# Patient Record
Sex: FEMALE | Race: WHITE | NOT HISPANIC OR LATINO | Employment: FULL TIME | URBAN - METROPOLITAN AREA
[De-identification: names, ages, dates, MRNs, and addresses within clinical notes are randomized per-mention and may not be internally consistent; named-entity substitution may affect disease eponyms.]

---

## 2017-03-01 ENCOUNTER — RESULTS ENCOUNTER (OUTPATIENT)
Dept: FAMILY MEDICINE CLINIC | Facility: CLINIC | Age: 57
End: 2017-03-01

## 2017-03-01 DIAGNOSIS — N28.9 LOW KIDNEY FUNCTION: ICD-10-CM

## 2018-10-10 DIAGNOSIS — Z12.39 BREAST CANCER SCREENING: Primary | ICD-10-CM

## 2018-11-09 ENCOUNTER — OFFICE VISIT (OUTPATIENT)
Dept: FAMILY MEDICINE CLINIC | Facility: CLINIC | Age: 58
End: 2018-11-09

## 2018-11-09 VITALS
BODY MASS INDEX: 22.07 KG/M2 | RESPIRATION RATE: 16 BRPM | WEIGHT: 149 LBS | HEIGHT: 69 IN | DIASTOLIC BLOOD PRESSURE: 70 MMHG | SYSTOLIC BLOOD PRESSURE: 124 MMHG | OXYGEN SATURATION: 97 % | HEART RATE: 80 BPM

## 2018-11-09 DIAGNOSIS — F34.1 DYSTHYMIA: ICD-10-CM

## 2018-11-09 DIAGNOSIS — Z00.00 ROUTINE GENERAL MEDICAL EXAMINATION AT A HEALTH CARE FACILITY: Primary | ICD-10-CM

## 2018-11-09 DIAGNOSIS — Z23 NEED FOR VACCINATION: ICD-10-CM

## 2018-11-09 DIAGNOSIS — Z78.0 OSTEOPENIA AFTER MENOPAUSE: ICD-10-CM

## 2018-11-09 DIAGNOSIS — M85.80 OSTEOPENIA AFTER MENOPAUSE: ICD-10-CM

## 2018-11-09 DIAGNOSIS — F40.243 ANXIETY WITH FLYING: ICD-10-CM

## 2018-11-09 PROBLEM — F32.A DEPRESSION: Status: ACTIVE | Noted: 2018-11-09

## 2018-11-09 PROCEDURE — 90471 IMMUNIZATION ADMIN: CPT | Performed by: FAMILY MEDICINE

## 2018-11-09 PROCEDURE — 90750 HZV VACC RECOMBINANT IM: CPT | Performed by: FAMILY MEDICINE

## 2018-11-09 PROCEDURE — 99396 PREV VISIT EST AGE 40-64: CPT | Performed by: FAMILY MEDICINE

## 2018-11-09 PROCEDURE — 90732 PPSV23 VACC 2 YRS+ SUBQ/IM: CPT | Performed by: FAMILY MEDICINE

## 2018-11-09 PROCEDURE — 90472 IMMUNIZATION ADMIN EACH ADD: CPT | Performed by: FAMILY MEDICINE

## 2018-11-09 RX ORDER — ALPRAZOLAM 0.25 MG/1
0.25 TABLET ORAL 2 TIMES DAILY PRN
Qty: 15 TABLET | Refills: 0 | Status: SHIPPED | OUTPATIENT
Start: 2018-11-09

## 2018-11-09 RX ORDER — LORATADINE 10 MG/1
10 TABLET ORAL
COMMUNITY

## 2018-11-09 RX ORDER — RALOXIFENE HYDROCHLORIDE 60 MG/1
60 TABLET, FILM COATED ORAL
COMMUNITY
Start: 2017-11-10 | End: 2018-11-09 | Stop reason: SDUPTHER

## 2018-11-09 RX ORDER — BUPROPION HYDROCHLORIDE 150 MG/1
150 TABLET, EXTENDED RELEASE ORAL DAILY
Qty: 90 TABLET | Refills: 2 | Status: SHIPPED | OUTPATIENT
Start: 2018-11-09 | End: 2020-05-08

## 2018-11-09 RX ORDER — RALOXIFENE HYDROCHLORIDE 60 MG/1
60 TABLET, FILM COATED ORAL DAILY
Qty: 90 TABLET | Refills: 2 | Status: SHIPPED | OUTPATIENT
Start: 2018-11-09 | End: 2019-10-09 | Stop reason: SDUPTHER

## 2018-11-09 NOTE — PATIENT INSTRUCTIONS
Annual Wellness  Personal Prevention Plan of Service   Please come back for fasting labs.  Date of Office Visit:  2018  Encounter Provider:  Maurilio Benoit MD  Place of Service:  [facility]  Patient Name: Sofia Mendez  :  1960    As part of the Annual Wellness portion of your visit today, we are providing you with this personalized preventive plan of services (PPPS). This plan is based upon recommendations of the [place] Preventive Services Task Force (USPSTF) and the Advisory Committee on Immunization Practices (ACIP).    This lists the preventive care services that should be considered, and provides dates of when you are due. Items listed as completed are up-to-date and do not require any further intervention.    Health Maintenance   Topic Date Due   • PNEUMOCOCCAL VACCINE (19-64 MEDIUM RISK) (1 of 1 - PPSV23) 1979   • ZOSTER VACCINE (1 of 2) 2010   • PAP SMEAR  10/01/2019   • DXA SCAN  10/24/2019   • ANNUAL PHYSICAL  11/10/2019   • MAMMOGRAM  2020   • TDAP/TD VACCINES (2 - Td) 2021   • COLONOSCOPY  10/09/2024   • HEPATITIS C SCREENING  Completed   • INFLUENZA VACCINE  Completed       Orders Placed This Encounter   Procedures   • Shingrix Vaccine   • Pneumococcal Polysaccharide Vaccine 23-Valent (PPSV23) Greater Than or Equal To 1yo Subcutaneous / IM   • CBC (No Diff)     Standing Status:   Future     Standing Expiration Date:   2019   • Comprehensive Metabolic Panel     Standing Status:   Future     Standing Expiration Date:   2019   • Lipid Panel With / Chol / HDL Ratio     Standing Status:   Future     Standing Expiration Date:   2019       Return in about 1 year (around 2019) for Annual physical.

## 2018-11-09 NOTE — PROGRESS NOTES
"                                                                                                                    Preventive Exam    History of Present Illness: Sofia is here for check up and review of routine health maintenance. She states she is doing well and has no concerns    REVIEW OF SYSTEMS  Constitutional: Negative.    HENT: Negative.    Eyes: Negative.    Respiratory: Negative.    Cardiovascular: Negative.    Gastrointestinal: Negative.    Endocrine: Negative.    Genitourinary: Negative.    Musculoskeletal: Negative.  Skin: Negative.    Allergic/Immunologic: Negative.    Neurological: Negative.    Hematological: Negative.    Psychiatric/Behavioral: Negative.    All other systems reviewed and are negative.          PHYSICAL EXAM    Vitals:    11/09/18 1532   BP: 124/70   Pulse: 80   Resp: 16   SpO2: 97%   Weight: 67.6 kg (149 lb)   Height: 175.3 cm (69\")     GENERAL: alert and oriented, afebrile and vital signs stable  HEENT: oral mucosa moist, PEERLA, EOM, conjunctiva normal  No cervical adenopathy  LUNGS: clear to ascultation bilaterally, no rales, ronchi or wheezing  HEART: RRR S1 S2 without murmers, thrills, rubs or gallops  CHEST WALL: within normal limits, no tenderness  BREAST EXAM: No masses, skin changes, tenderness or discharge noted  ABDOMEN: WNL. Normal BS.  EXTREMITIES: No clubbing, cyanosis or edema noted. Normal Pulses.  SKIN: warm, dry, no rashes noted  NEURO: CN II- XII grossly intact    ASSESSMENT AND PLAN  Problem List Items Addressed This Visit     None        Routine health maintenance reviewed and discussed with Sofia.    .No orders of the defined types were placed in this encounter.    No Follow-up on file.   Preventive Exam    History of Present Illness: Sofia is here for check up and review of routine health maintenance. She states she is doing well and has no concerns    REVIEW OF SYSTEMS  Constitutional: Negative.    HENT: Negative.    Eyes: Negative.    Respiratory: Negative.  " "  Cardiovascular: Negative.    Gastrointestinal: Negative.    Endocrine: Negative.    Genitourinary: Negative.    Musculoskeletal: Negative.  Skin: Negative.    Allergic/Immunologic: Negative.    Neurological: Negative.    Hematological: Negative.    Psychiatric/Behavioral: Negative.    All other systems reviewed and are negative.          PHYSICAL EXAM    Vitals:    11/09/18 1532   BP: 124/70   Pulse: 80   Resp: 16   SpO2: 97%   Weight: 67.6 kg (149 lb)   Height: 175.3 cm (69\")     GENERAL: alert and oriented, afebrile and vital signs stable  HEENT: oral mucosa moist, PEERLA, EOM, conjunctiva normal  No cervical adenopathy  LUNGS: clear to ascultation bilaterally, no rales, ronchi or wheezing  HEART: RRR S1 S2 without murmers, thrills, rubs or gallops  CHEST WALL: within normal limits, no tenderness  BREAST EXAM: No masses, skin changes, tenderness or discharge noted  ABDOMEN: WNL. Normal BS.  EXTREMITIES: No clubbing, cyanosis or edema noted. Normal Pulses.  SKIN: warm, dry, no rashes noted  NEURO: CN II- XII grossly intact    ASSESSMENT AND PLAN  Problem List Items Addressed This Visit     None        Routine health maintenance reviewed and discussed with Sofia.    .No orders of the defined types were placed in this encounter.    No Follow-up on file.   Preventive Exam    History of Present Illness: Sofia is here for check up and review of routine health maintenance. She states she is doing well and has no concerns    REVIEW OF SYSTEMS  Constitutional: Negative.    HENT: Negative.    Eyes: Negative.    Respiratory: Negative.    Cardiovascular: Negative.    Gastrointestinal: Negative.    Endocrine: Negative.    Genitourinary: Negative.    Musculoskeletal: Negative.  Skin: Negative.    Allergic/Immunologic: Negative.    Neurological: Negative.    Hematological: Negative.    Psychiatric/Behavioral: Negative.    All other systems reviewed and are negative.          PHYSICAL EXAM    Vitals:    11/09/18 1532   BP: 124/70 " "  Pulse: 80   Resp: 16   SpO2: 97%   Weight: 67.6 kg (149 lb)   Height: 175.3 cm (69\")     GENERAL: alert and oriented, afebrile and vital signs stable  HEENT: oral mucosa moist, PEERLA, EOM, conjunctiva normal  No cervical adenopathy  LUNGS: clear to ascultation bilaterally, no rales, ronchi or wheezing  HEART: RRR S1 S2 without murmers, thrills, rubs or gallops  CHEST WALL: within normal limits, no tenderness  BREAST EXAM: No masses, skin changes, tenderness or discharge noted  ABDOMEN: WNL. Normal BS.  EXTREMITIES: No clubbing, cyanosis or edema noted. Normal Pulses.  SKIN: warm, dry, no rashes noted  NEURO: CN II- XII grossly intact    ASSESSMENT AND PLAN  Problem List Items Addressed This Visit     None        Routine health maintenance reviewed and discussed with Sofia.    .No orders of the defined types were placed in this encounter.    No Follow-up on file.   Preventive Exam    History of Present Illness: Sofia is here for check up and review of routine health maintenance. She states she is doing well and has no concerns    REVIEW OF SYSTEMS  Constitutional: Negative.    HENT: Negative.    Eyes: Negative.    Respiratory: Negative.    Cardiovascular: Negative.    Gastrointestinal: Negative.    Endocrine: Negative.    Genitourinary: Negative.    Musculoskeletal: Negative.  Skin: Negative.    Allergic/Immunologic: Negative.    Neurological: Negative.    Hematological: Negative.    Psychiatric/Behavioral: Negative.    All other systems reviewed and are negative.          PHYSICAL EXAM    Vitals:    11/09/18 1532   BP: 124/70   Pulse: 80   Resp: 16   SpO2: 97%   Weight: 67.6 kg (149 lb)   Height: 175.3 cm (69\")     GENERAL: alert and oriented, afebrile and vital signs stable  HEENT: oral mucosa moist, PEERLA, EOM, conjunctiva normal  No cervical adenopathy  LUNGS: clear to ascultation bilaterally, no rales, ronchi or wheezing  HEART: RRR S1 S2 without murmers, thrills, rubs or gallops  CHEST WALL: within normal " "limits, no tenderness  BREAST EXAM: No masses, skin changes, tenderness or discharge noted  ABDOMEN: WNL. Normal BS.  EXTREMITIES: No clubbing, cyanosis or edema noted. Normal Pulses.  SKIN: warm, dry, no rashes noted  NEURO: CN II- XII grossly intact    ASSESSMENT AND PLAN  Problem List Items Addressed This Visit     None        Routine health maintenance reviewed and discussed with Sofia.    .No orders of the defined types were placed in this encounter.    No Follow-up on file.   Preventive Exam    History of Present Illness: Sofia is here for check up and review of routine health maintenance. She states she is doing well and has no concerns    REVIEW OF SYSTEMS  Constitutional: Negative.    HENT: Negative.    Eyes: Negative.    Respiratory: Negative.    Cardiovascular: Negative.    Gastrointestinal: Negative.    Endocrine: Negative.    Genitourinary: Negative.    Musculoskeletal: Negative.  Skin: Negative.    Allergic/Immunologic: Negative.    Neurological: Negative.    Hematological: Negative.    Psychiatric/Behavioral: Negative.    All other systems reviewed and are negative.          PHYSICAL EXAM    Vitals:    11/09/18 1532   BP: 124/70   Pulse: 80   Resp: 16   SpO2: 97%   Weight: 67.6 kg (149 lb)   Height: 175.3 cm (69\")     GENERAL: alert and oriented, afebrile and vital signs stable  HEENT: oral mucosa moist, PEERLA, EOM, conjunctiva normal  No cervical adenopathy  LUNGS: clear to ascultation bilaterally, no rales, ronchi or wheezing  HEART: RRR S1 S2 without murmers, thrills, rubs or gallops  CHEST WALL: within normal limits, no tenderness  BREAST EXAM: No masses, skin changes, tenderness or discharge noted  ABDOMEN: WNL. Normal BS.  EXTREMITIES: No clubbing, cyanosis or edema noted. Normal Pulses.  SKIN: warm, dry, no rashes noted  NEURO: CN II- XII grossly intact    ASSESSMENT AND PLAN  Problem List Items Addressed This Visit     None        Routine health maintenance reviewed and discussed with " "Sofia.    .No orders of the defined types were placed in this encounter.    No Follow-up on file.   Preventive Exam    History of Present Illness: Sofia is here for check up and review of routine health maintenance. She states she is doing well and has no concerns    REVIEW OF SYSTEMS  Constitutional: Negative.    HENT: Negative.    Eyes: Negative.    Respiratory: Negative.    Cardiovascular: Negative.    Gastrointestinal: Negative.    Endocrine: Negative.    Genitourinary: Negative.    Musculoskeletal: Negative.  Skin: Negative.    Allergic/Immunologic: Negative.    Neurological: Negative.    Hematological: Negative.    Psychiatric/Behavioral: Negative.    All other systems reviewed and are negative.          PHYSICAL EXAM    Vitals:    11/09/18 1532   BP: 124/70   Pulse: 80   Resp: 16   SpO2: 97%   Weight: 67.6 kg (149 lb)   Height: 175.3 cm (69\")     GENERAL: alert and oriented, afebrile and vital signs stable  HEENT: oral mucosa moist, PEERLA, EOM, conjunctiva normal  No cervical adenopathy  LUNGS: clear to ascultation bilaterally, no rales, ronchi or wheezing  HEART: RRR S1 S2 without murmers, thrills, rubs or gallops  CHEST WALL: within normal limits, no tenderness  BREAST EXAM: No masses, skin changes, tenderness or discharge noted  ABDOMEN: WNL. Normal BS.  EXTREMITIES: No clubbing, cyanosis or edema noted. Normal Pulses.  SKIN: warm, dry, no rashes noted  NEURO: CN II- XII grossly intact    ASSESSMENT AND PLAN  Problem List Items Addressed This Visit     None        Routine health maintenance reviewed and discussed with Sofia.    .No orders of the defined types were placed in this encounter.    No Follow-up on file.   Preventive Exam    History of Present Illness: Sofia is here for check up and review of routine health maintenance. She states she is doing well and has no concerns    REVIEW OF SYSTEMS  Constitutional: Negative.    HENT: Negative.    Eyes: Negative.    Respiratory: Negative.    Cardiovascular: " "Negative.    Gastrointestinal: Negative.    Endocrine: Negative.    Genitourinary: Negative.    Musculoskeletal: Negative.  Skin: Negative.    Allergic/Immunologic: Negative.    Neurological: Negative.    Hematological: Negative.    Psychiatric/Behavioral: Negative.    All other systems reviewed and are negative.          PHYSICAL EXAM    Vitals:    11/09/18 1532   BP: 124/70   Pulse: 80   Resp: 16   SpO2: 97%   Weight: 67.6 kg (149 lb)   Height: 175.3 cm (69\")     GENERAL: alert and oriented, afebrile and vital signs stable  HEENT: oral mucosa moist, PEERLA, EOM, conjunctiva normal  No cervical adenopathy  LUNGS: clear to ascultation bilaterally, no rales, ronchi or wheezing  HEART: RRR S1 S2 without murmers, thrills, rubs or gallops  CHEST WALL: within normal limits, no tenderness  BREAST EXAM: No masses, skin changes, tenderness or discharge noted  ABDOMEN: WNL. Normal BS.  EXTREMITIES: No clubbing, cyanosis or edema noted. Normal Pulses.  SKIN: warm, dry, no rashes noted  NEURO: CN II- XII grossly intact    ASSESSMENT AND PLAN  Problem List Items Addressed This Visit     None        Routine health maintenance reviewed and discussed with Sofia.    .No orders of the defined types were placed in this encounter.    No Follow-up on file.   Preventive Exam    History of Present Illness: Sofia is here for check up and review of routine health maintenance. She states she is doing well and has no concerns    REVIEW OF SYSTEMS  Constitutional: Negative.    HENT: Negative.    Eyes: Negative.    Respiratory: Negative.    Cardiovascular: Negative.    Gastrointestinal: Negative.    Endocrine: Negative.    Genitourinary: Negative.    Musculoskeletal: Negative.  Skin: Negative.    Allergic/Immunologic: Negative.    Neurological: Negative.    Hematological: Negative.    Psychiatric/Behavioral: Negative.    All other systems reviewed and are negative.          PHYSICAL EXAM    Vitals:    11/09/18 1532   BP: 124/70   Pulse: 80 " "  Resp: 16   SpO2: 97%   Weight: 67.6 kg (149 lb)   Height: 175.3 cm (69\")     GENERAL: alert and oriented, afebrile and vital signs stable  HEENT: oral mucosa moist, PEERLA, EOM, conjunctiva normal  No cervical adenopathy  LUNGS: clear to ascultation bilaterally, no rales, ronchi or wheezing  HEART: RRR S1 S2 without murmers, thrills, rubs or gallops  CHEST WALL: within normal limits, no tenderness  BREAST EXAM: No masses, skin changes, tenderness or discharge noted  ABDOMEN: WNL. Normal BS.  EXTREMITIES: No clubbing, cyanosis or edema noted. Normal Pulses.  SKIN: warm, dry, no rashes noted  NEURO: CN II- XII grossly intact    ASSESSMENT AND PLAN  Problem List Items Addressed This Visit     None        Routine health maintenance reviewed and discussed with Sofia.    .No orders of the defined types were placed in this encounter.    No Follow-up on file.   Preventive Exam    History of Present Illness: Sofia is here for check up and review of routine health maintenance. She states she is doing well and has no concerns    REVIEW OF SYSTEMS  Constitutional: Negative.    HENT: Negative.    Eyes: Negative.    Respiratory: Negative.    Cardiovascular: Negative.    Gastrointestinal: Negative.    Endocrine: Negative.    Genitourinary: Negative.    Musculoskeletal: Negative.  Skin: Negative.    Allergic/Immunologic: Negative.    Neurological: Negative.    Hematological: Negative.    Psychiatric/Behavioral: Negative.    All other systems reviewed and are negative.          PHYSICAL EXAM    Vitals:    11/09/18 1532   BP: 124/70   Pulse: 80   Resp: 16   SpO2: 97%   Weight: 67.6 kg (149 lb)   Height: 175.3 cm (69\")     GENERAL: alert and oriented, afebrile and vital signs stable  HEENT: oral mucosa moist, PEERLA, EOM, conjunctiva normal  No cervical adenopathy  LUNGS: clear to ascultation bilaterally, no rales, ronchi or wheezing  HEART: RRR S1 S2 without murmers, thrills, rubs or gallops  CHEST WALL: within normal limits, no " "tenderness  BREAST EXAM: No masses, skin changes, tenderness or discharge noted  ABDOMEN: WNL. Normal BS.  EXTREMITIES: No clubbing, cyanosis or edema noted. Normal Pulses.  SKIN: warm, dry, no rashes noted  NEURO: CN II- XII grossly intact    ASSESSMENT AND PLAN  Problem List Items Addressed This Visit     None        Routine health maintenance reviewed and discussed with Sofia.    .No orders of the defined types were placed in this encounter.    No Follow-up on file.   Preventive Exam    History of Present Illness: Sofia is here for check up and review of routine health maintenance. She states she is doing well and has no concerns    REVIEW OF SYSTEMS  Constitutional: Negative.    HENT: Negative.    Eyes: Negative.    Respiratory: Negative.    Cardiovascular: Negative.    Gastrointestinal: Negative.    Endocrine: Negative.    Genitourinary: Negative.    Musculoskeletal: Negative.  Skin: Negative.    Allergic/Immunologic: Negative.    Neurological: Negative.    Hematological: Negative.    Psychiatric/Behavioral: Negative.    All other systems reviewed and are negative.          PHYSICAL EXAM    Vitals:    11/09/18 1532   BP: 124/70   Pulse: 80   Resp: 16   SpO2: 97%   Weight: 67.6 kg (149 lb)   Height: 175.3 cm (69\")     GENERAL: alert and oriented, afebrile and vital signs stable  HEENT: oral mucosa moist, PEERLA, EOM, conjunctiva normal  No cervical adenopathy  LUNGS: clear to ascultation bilaterally, no rales, ronchi or wheezing  HEART: RRR S1 S2 without murmers, thrills, rubs or gallops  CHEST WALL: within normal limits, no tenderness  BREAST EXAM: No masses, skin changes, tenderness or discharge noted  ABDOMEN: WNL. Normal BS.  EXTREMITIES: No clubbing, cyanosis or edema noted. Normal Pulses.  SKIN: warm, dry, no rashes noted  NEURO: CN II- XII grossly intact    ASSESSMENT AND PLAN  Problem List Items Addressed This Visit     None        Routine health maintenance reviewed and discussed with Sofia.    .No orders " "of the defined types were placed in this encounter.    No Follow-up on file.   Preventive Exam    History of Present Illness: Sofia is here for check up and review of routine health maintenance. She states she is doing well and has no concerns    REVIEW OF SYSTEMS  Constitutional: Negative.    HENT: Negative.    Eyes: Negative.    Respiratory: Negative.    Cardiovascular: Negative.    Gastrointestinal: Negative.    Endocrine: Negative.    Genitourinary: Negative.    Musculoskeletal: Negative.  Skin: Negative.    Allergic/Immunologic: Negative.    Neurological: Negative.    Hematological: Negative.    Psychiatric/Behavioral: Negative.    All other systems reviewed and are negative.          PHYSICAL EXAM    Vitals:    11/09/18 1532   BP: 124/70   Pulse: 80   Resp: 16   SpO2: 97%   Weight: 67.6 kg (149 lb)   Height: 175.3 cm (69\")     GENERAL: alert and oriented, afebrile and vital signs stable  HEENT: oral mucosa moist, PEERLA, EOM, conjunctiva normal  No cervical adenopathy  LUNGS: clear to ascultation bilaterally, no rales, ronchi or wheezing  HEART: RRR S1 S2 without murmers, thrills, rubs or gallops  CHEST WALL: within normal limits, no tenderness  BREAST EXAM: No masses, skin changes, tenderness or discharge noted  ABDOMEN: WNL. Normal BS.  EXTREMITIES: No clubbing, cyanosis or edema noted. Normal Pulses.  SKIN: warm, dry, no rashes noted  NEURO: CN II- XII grossly intact    ASSESSMENT AND PLAN  Problem List Items Addressed This Visit     None        Routine health maintenance reviewed and discussed with Sofia.    .No orders of the defined types were placed in this encounter.    No Follow-up on file.   Preventive Exam    History of Present Illness: Sofia is here for check up and review of routine health maintenance. She states she is doing well and has no concerns    REVIEW OF SYSTEMS  Constitutional: Negative.    HENT: Negative.    Eyes: Negative.    Respiratory: Negative.    Cardiovascular: Negative.  " "  Gastrointestinal: Negative.    Endocrine: Negative.    Genitourinary: Negative.    Musculoskeletal: Negative.  Skin: Negative.    Allergic/Immunologic: Negative.    Neurological: Negative.    Hematological: Negative.    Psychiatric/Behavioral: Negative.    All other systems reviewed and are negative.          PHYSICAL EXAM    Vitals:    11/09/18 1532   BP: 124/70   Pulse: 80   Resp: 16   SpO2: 97%   Weight: 67.6 kg (149 lb)   Height: 175.3 cm (69\")     GENERAL: alert and oriented, afebrile and vital signs stable  HEENT: oral mucosa moist, PEERLA, EOM, conjunctiva normal  No cervical adenopathy  LUNGS: clear to ascultation bilaterally, no rales, ronchi or wheezing  HEART: RRR S1 S2 without murmers, thrills, rubs or gallops  CHEST WALL: within normal limits, no tenderness  BREAST EXAM: No masses, skin changes, tenderness or discharge noted  ABDOMEN: WNL. Normal BS.  EXTREMITIES: No clubbing, cyanosis or edema noted. Normal Pulses.  SKIN: warm, dry, no rashes noted  NEURO: CN II- XII grossly intact    ASSESSMENT AND PLAN  Problem List Items Addressed This Visit     None        Routine health maintenance reviewed and discussed with Sofia.    .No orders of the defined types were placed in this encounter.    No Follow-up on file.   Preventive Exam    History of Present Illness: Sofia is here for check up and review of routine health maintenance. She states she is doing well and has no concerns    REVIEW OF SYSTEMS  Constitutional: Negative.    HENT: Negative.    Eyes: Negative.    Respiratory: Negative.    Cardiovascular: Negative.    Gastrointestinal: Negative.    Endocrine: Negative.    Genitourinary: Negative.    Musculoskeletal: Negative.  Skin: Negative.    Allergic/Immunologic: Negative.    Neurological: Negative.    Hematological: Negative.    Psychiatric/Behavioral: Negative.    All other systems reviewed and are negative.          PHYSICAL EXAM    Vitals:    11/09/18 1532   BP: 124/70   Pulse: 80   Resp: 16 " "  SpO2: 97%   Weight: 67.6 kg (149 lb)   Height: 175.3 cm (69\")     GENERAL: alert and oriented, afebrile and vital signs stable  HEENT: oral mucosa moist, PEERLA, EOM, conjunctiva normal  No cervical adenopathy  LUNGS: clear to ascultation bilaterally, no rales, ronchi or wheezing  HEART: RRR S1 S2 without murmers, thrills, rubs or gallops  CHEST WALL: within normal limits, no tenderness  BREAST EXAM: No masses, skin changes, tenderness or discharge noted  ABDOMEN: WNL. Normal BS.  EXTREMITIES: No clubbing, cyanosis or edema noted. Normal Pulses.  SKIN: warm, dry, no rashes noted  NEURO: CN II- XII grossly intact    ASSESSMENT AND PLAN  Problem List Items Addressed This Visit     None        Routine health maintenance reviewed and discussed with Sofia.    .No orders of the defined types were placed in this encounter.    No Follow-up on file.   Preventive Exam    History of Present Illness: Sofia is here for check up and review of routine health maintenance. She states she is doing well and has no concerns    REVIEW OF SYSTEMS  Constitutional: Negative.    HENT: Negative.    Eyes: Negative.    Respiratory: Negative.    Cardiovascular: Negative.    Gastrointestinal: Negative.    Endocrine: Negative.    Genitourinary: Negative.    Musculoskeletal: Negative.  Skin: Negative.    Allergic/Immunologic: Negative.    Neurological: Negative.    Hematological: Negative.    Psychiatric/Behavioral: Negative.    All other systems reviewed and are negative.          PHYSICAL EXAM    Vitals:    11/09/18 1532   BP: 124/70   Pulse: 80   Resp: 16   SpO2: 97%   Weight: 67.6 kg (149 lb)   Height: 175.3 cm (69\")     GENERAL: alert and oriented, afebrile and vital signs stable  HEENT: oral mucosa moist, PEERLA, EOM, conjunctiva normal  No cervical adenopathy  LUNGS: clear to ascultation bilaterally, no rales, ronchi or wheezing  HEART: RRR S1 S2 without murmers, thrills, rubs or gallops  CHEST WALL: within normal limits, no " of the defined types were placed in this encounter.    No Follow-up on file.

## 2018-11-09 NOTE — PROGRESS NOTES
"Preventive Exam    History of Present Illness: Sofia is here for check up and review of routine health maintenance. She states she is doing well;  Sofia states she has a hx of depression and  that she is getting good relief from symptoms on current medication, Wellbutrin. This is a chronic problem that is responding well to treatment. She has been treated for this in the past by her gyn and is asking me to help with this now. Sofia has no intolerable side effects to medication and reports that his helps lift mood and makes daily life, relationships better. No thoughts of self harm expressed.  She has a hx of osteopenia and is on Evista. She will not be seeing her gynecologist for a year and has asked me to refill this for her today.  She will be travelling to [place] in a month to join her son there for the holidays. She is requesting medication to help with flight anxiety.    REVIEW OF SYSTEMS  Constitutional: Negative.    HENT: Negative.    Eyes: Negative.    Respiratory: Negative.    Cardiovascular: Negative.    Gastrointestinal: Negative.    Endocrine: Negative.    Genitourinary: Negative.    Musculoskeletal: Negative.  Skin: Negative.    Allergic/Immunologic: Negative.    Neurological: Negative.    Hematological: Negative.    Psychiatric/Behavioral: Negative.    All other systems reviewed and are negative.          PHYSICAL EXAM    Vitals:    11/09/18 1532   BP: 124/70   Pulse: 80   Resp: 16   SpO2: 97%   Weight: 67.6 kg (149 lb)   Height: 175.3 cm (69\")     GENERAL: alert and oriented, afebrile and vital signs stable  HEENT: oral mucosa moist, PEERLA, EOM, conjunctiva normal  No cervical adenopathy  LUNGS: clear to ascultation bilaterally, no rales, ronchi or wheezing  HEART: RRR S1 S2 without murmers, thrills, rubs or gallops  CHEST WALL: within normal limits, no tenderness  ABDOMEN: WNL. Normal BS.  EXTREMITIES: No clubbing, cyanosis or edema noted. Normal Pulses.  SKIN: warm, dry, no rashes noted  NEURO: CN II- " XII grossly intact  PSYCH: good mood, positive affect, good insight, no SI or HI expressed.    ASSESSMENT AND PLAN  Problem List Items Addressed This Visit        Musculoskeletal and Integument    Osteopenia after menopause  Will refill Evista and advised f/u with gyn    Relevant Medications    raloxifene (EVISTA) 60 MG tablet       Other    Depression  She is well managed on current meds and reports no signs or symptoms of self harm, suicidal ideation. This medication helps with daily life and relationships. Will refill as needed and advised 6 month follow up.    Relevant Medications    buPROPion SR (WELLBUTRIN SR) 150 MG 12 hr tablet    Anxiety with flying  I have given her a short course of Xanax to help.    ALPRAZolam (XANAX) 0.25 MG tablet      Other Visit Diagnoses     Routine general medical examination at a health care facility    -  Primary    Relevant Orders    CBC (No Diff)    Comprehensive Metabolic Panel    Lipid Panel With / Chol / HDL Ratio    Need for vaccination        Relevant Orders    Shingrix Vaccine (Completed)    Pneumococcal Polysaccharide Vaccine 23-Valent (PPSV23) Greater Than or Equal To 3yo Subcutaneous / IM (Completed)        Routine health maintenance reviewed and discussed with Sofia.    .  Orders Placed This Encounter   Procedures   • Shingrix Vaccine   • Pneumococcal Polysaccharide Vaccine 23-Valent (PPSV23) Greater Than or Equal To 3yo Subcutaneous / IM   • CBC (No Diff)     Standing Status:   Future     Standing Expiration Date:   11/9/2019   • Comprehensive Metabolic Panel     Standing Status:   Future     Standing Expiration Date:   11/9/2019   • Lipid Panel With / Chol / HDL Ratio     Standing Status:   Future     Standing Expiration Date:   11/9/2019     Return in about 1 year (around 11/9/2019) for Annual physical.

## 2018-11-23 ENCOUNTER — RESULTS ENCOUNTER (OUTPATIENT)
Dept: FAMILY MEDICINE CLINIC | Facility: CLINIC | Age: 58
End: 2018-11-23

## 2018-11-23 DIAGNOSIS — Z00.00 ROUTINE GENERAL MEDICAL EXAMINATION AT A HEALTH CARE FACILITY: ICD-10-CM

## 2018-12-12 LAB
ALBUMIN SERPL-MCNC: 4.2 G/DL (ref 3.5–5.2)
ALBUMIN/GLOB SERPL: 2.3 G/DL
ALP SERPL-CCNC: 53 U/L (ref 39–117)
ALT SERPL-CCNC: 13 U/L (ref 1–33)
AST SERPL-CCNC: 16 U/L (ref 1–32)
BILIRUB SERPL-MCNC: 0.8 MG/DL (ref 0.1–1.2)
BUN SERPL-MCNC: 15 MG/DL (ref 6–20)
BUN/CREAT SERPL: 15.3 (ref 7–25)
CALCIUM SERPL-MCNC: 8.6 MG/DL (ref 8.6–10.5)
CHLORIDE SERPL-SCNC: 103 MMOL/L (ref 98–107)
CHOLEST SERPL-MCNC: 211 MG/DL (ref 0–200)
CHOLEST/HDLC SERPL: 2.27 {RATIO}
CO2 SERPL-SCNC: 27.4 MMOL/L (ref 22–29)
CREAT SERPL-MCNC: 0.98 MG/DL (ref 0.57–1)
ERYTHROCYTE [DISTWIDTH] IN BLOOD BY AUTOMATED COUNT: 13.7 % (ref 11.7–13)
GLOBULIN SER CALC-MCNC: 1.8 GM/DL
GLUCOSE SERPL-MCNC: 88 MG/DL (ref 65–99)
HCT VFR BLD AUTO: 42.3 % (ref 35.6–45.5)
HDLC SERPL-MCNC: 93 MG/DL (ref 40–60)
HGB BLD-MCNC: 13.1 G/DL (ref 11.9–15.5)
LDLC SERPL CALC-MCNC: 107 MG/DL (ref 0–100)
MCH RBC QN AUTO: 31 PG (ref 26.9–32)
MCHC RBC AUTO-ENTMCNC: 31 G/DL (ref 32.4–36.3)
MCV RBC AUTO: 100 FL (ref 80.5–98.2)
PLATELET # BLD AUTO: 226 10*3/MM3 (ref 140–500)
POTASSIUM SERPL-SCNC: 4.1 MMOL/L (ref 3.5–5.2)
PROT SERPL-MCNC: 6 G/DL (ref 6–8.5)
RBC # BLD AUTO: 4.23 10*6/MM3 (ref 3.9–5.2)
SODIUM SERPL-SCNC: 142 MMOL/L (ref 136–145)
TRIGL SERPL-MCNC: 56 MG/DL (ref 0–150)
VLDLC SERPL CALC-MCNC: 11.2 MG/DL (ref 5–40)
WBC # BLD AUTO: 5.85 10*3/MM3 (ref 4.5–10.7)

## 2019-10-09 DIAGNOSIS — M85.80 OSTEOPENIA AFTER MENOPAUSE: ICD-10-CM

## 2019-10-09 DIAGNOSIS — Z78.0 OSTEOPENIA AFTER MENOPAUSE: ICD-10-CM

## 2019-10-09 RX ORDER — RALOXIFENE HYDROCHLORIDE 60 MG/1
TABLET, FILM COATED ORAL
Qty: 90 TABLET | Refills: 1 | Status: SHIPPED | OUTPATIENT
Start: 2019-10-09

## 2020-05-08 ENCOUNTER — TELEMEDICINE (OUTPATIENT)
Dept: FAMILY MEDICINE CLINIC | Facility: CLINIC | Age: 60
End: 2020-05-08

## 2020-05-08 DIAGNOSIS — L24.9 IRRITANT CONTACT DERMATITIS, UNSPECIFIED TRIGGER: Primary | ICD-10-CM

## 2020-05-08 PROCEDURE — 99213 OFFICE O/P EST LOW 20 MIN: CPT | Performed by: NURSE PRACTITIONER

## 2020-05-08 RX ORDER — METHYLPREDNISOLONE 4 MG/1
TABLET ORAL
Qty: 1 EACH | Refills: 0 | Status: SHIPPED | OUTPATIENT
Start: 2020-05-08 | End: 2023-03-24

## 2020-05-08 RX ORDER — ESTRADIOL 0.1 MG/G
1 CREAM VAGINAL 2 TIMES WEEKLY
COMMUNITY
Start: 2016-10-19

## 2020-05-08 NOTE — PROGRESS NOTES
"Subjective   Sofia Mendez is a 59 y.o. female. She is a patient of Dr. Benoit, but is new to me.    History of Present Illness   Pt presents via tele-visit for c/o rash on right shoulder and arm. She states that it started last weekend with a small \"bump\" on her right shoulder which she states that she thought was a \"bug bite\".  Her rash has now spread under her arm on the right and now the left side. She has been taking benadryl and using over the counter hydrocortisone cream due to itching.  Pt is concerned that she has COVID due to something she read on the internet.  She denies being exposed to anyone with the virus.  She denies any fever, cough, shortness of breath or loss of taste or smell. She requested to be tested and I explained that she will need to go to  on [facility] for this, as we are not testing in our offices. Pt became very agitated.     The following portions of the patient's history were reviewed and updated as appropriate: allergies, current medications, past family history, past medical history, past social history, past surgical history and problem list.    Review of Systems   Constitutional: Positive for fatigue. Negative for chills and fever.   Respiratory: Negative for cough and shortness of breath.    Cardiovascular: Negative for chest pain, palpitations and leg swelling.   Musculoskeletal: Negative for arthralgias and joint swelling.   Skin: Positive for rash. Negative for color change, dry skin, pallor, skin lesions and bruise.        Right shoulder and underarm. Left underarm.    Allergic/Immunologic: Negative.    Neurological: Negative for dizziness, weakness and headache.       Objective   Physical Exam   Constitutional: She appears well-developed and well-nourished. No distress.   HENT:   Head: Normocephalic and atraumatic.   Eyes: Pupils are equal, round, and reactive to light. EOM are normal.   Neck: Normal range of motion. Neck supple.   Pulmonary/Chest: Effort normal. "   Skin: Rash noted. She is not diaphoretic.   Erythematous raised rash to right inner upper arm and axilla and left inner upper arm.  No drainage noted.    Psychiatric: Her speech is normal. Judgment and thought content normal. Her mood appears anxious. Her affect is not angry. She is agitated. Cognition and memory are normal. She does not exhibit a depressed mood.       There were no vitals filed for this visit.  There is no height or weight on file to calculate BMI.    Procedures    Assessment/Plan   Problems Addressed this Visit     None      Visit Diagnoses     Irritant contact dermatitis, unspecified trigger    -  Primary    Relevant Medications    methylPREDNISolone (MEDROL, HARVEY,) 4 MG tablet        Return if symptoms worsen or fail to improve.    Total face to face time with patient was 15 minutes.           Patient Instructions   Continue Benadryl as needed for itching.    Rash, Adult  A rash is a change in the color of your skin. A rash can also change the way your skin feels. There are many different conditions and factors that can cause a rash. Some rashes may disappear after a few days, but some may last for a few weeks. Common causes of rashes include:  · Viral infections, such as:  ? Colds.  ? Measles.  ? Hand, foot, and mouth disease.  · Bacterial infections, such as:  ? Scarlet fever.  ? Impetigo.  · Fungal infections, such as Candida.  · Allergic reactions to food, medicines, or skin care products.  Follow these instructions at home:  The goal of treatment is to stop the itching and keep the rash from spreading. Pay attention to any changes in your symptoms. Follow these instructions to help with your condition:  Medicine  Take or apply over-the-counter and prescription medicines only as told by your health care provider. These may include:  · Corticosteroid creams to treat red or swollen skin.  · Anti-itch lotions.  · Oral allergy medicines (antihistamines).  · Oral corticosteroids for severe  symptoms.    Skin care  · Apply cool compresses to the affected areas.  · Do not scratch or rub your skin.  · Avoid covering the rash. Make sure the rash is exposed to air as much as possible.  Managing itching and discomfort  · Avoid hot showers or baths, which can make itching worse. A cold shower may help.  · Try taking a bath with:  ? Epsom salts. Follow  instructions on the packaging. You can get these at your local pharmacy or grocery store.  ? Baking soda. Pour a small amount into the bath as told by your health care provider.  ? Colloidal oatmeal. Follow  instructions on the packaging. You can get this at your local pharmacy or grocery store.  · Try applying baking soda paste to your skin. Stir water into baking soda until it reaches a paste-like consistency.  · Try applying calamine lotion. This is an over-the-counter lotion that helps to relieve itchiness.  · Keep cool and out of the sun. Sweating and being hot can make itching worse.  General instructions    · Rest as needed.  · Drink enough fluid to keep your urine pale yellow.  · Wear loose-fitting clothing.  · Avoid scented soaps, detergents, and perfumes. Use gentle soaps, detergents, perfumes, and other cosmetic products.  · Avoid any substance that causes your rash. Keep a journal to help track what causes your rash. Write down:  ? What you eat.  ? What cosmetic products you use.  ? What you drink.  ? What you wear. This includes jewelry.  · Keep all follow-up visits as told by your health care provider. This is important.  Contact a health care provider if:  · You sweat at night.  · You lose weight.  · You urinate more than normal.  · You urinate less than normal, or you notice that your urine is a darker color than usual.  · You feel weak.  · You vomit.  · Your skin or the whites of your eyes look yellow (jaundice).  · Your skin:  ? Tingles.  ? Is numb.  · Your rash:  ? Does not go away after several days.  ? Gets  "worse.  · You are:  ? Unusually thirsty.  ? More tired than normal.  · You have:  ? New symptoms.  ? Pain in your abdomen.  ? A fever.  ? Diarrhea.  Get help right away if you:  · Have a fever and your symptoms suddenly get worse.  · Develop confusion.  · Have a severe headache or a stiff neck.  · Have severe joint pains or stiffness.  · Have a seizure.  · Develop a rash that covers all or most of your body. The rash may or may not be painful.  · Develop blisters that:  ? Are on top of the rash.  ? Grow larger or grow together.  ? Are painful.  ? Are inside your nose or mouth.  · Develop a rash that:  ? Looks like purple pinprick-sized spots all over your body.  ? Has a \"bull's eye\" or looks like a target.  ? Is not related to sun exposure, is red and painful, and causes your skin to peel.  Summary  · A rash is a change in the color of your skin. Some rashes disappear after a few days, but some may last for a few weeks.  · The goal of treatment is to stop the itching and keep the rash from spreading.  · Take or apply over-the-counter and prescription medicines only as told by your health care provider.  · Contact a health care provider if you have new or worsening symptoms.  · Keep all follow-up visits as told by your health care provider. This is important.  This information is not intended to replace advice given to you by your health care provider. Make sure you discuss any questions you have with your health care provider.  Document Released: 12/08/2003 Document Revised: 07/22/2019 Document Reviewed: 07/22/2019  [employer] Interactive Patient Education © 2020 [employer]        "

## 2020-05-08 NOTE — PATIENT INSTRUCTIONS
Continue Benadryl as needed for itching.    Rash, Adult  A rash is a change in the color of your skin. A rash can also change the way your skin feels. There are many different conditions and factors that can cause a rash. Some rashes may disappear after a few days, but some may last for a few weeks. Common causes of rashes include:  · Viral infections, such as:  ? Colds.  ? Measles.  ? Hand, foot, and mouth disease.  · Bacterial infections, such as:  ? Scarlet fever.  ? Impetigo.  · Fungal infections, such as Candida.  · Allergic reactions to food, medicines, or skin care products.  Follow these instructions at home:  The goal of treatment is to stop the itching and keep the rash from spreading. Pay attention to any changes in your symptoms. Follow these instructions to help with your condition:  Medicine  Take or apply over-the-counter and prescription medicines only as told by your health care provider. These may include:  · Corticosteroid creams to treat red or swollen skin.  · Anti-itch lotions.  · Oral allergy medicines (antihistamines).  · Oral corticosteroids for severe symptoms.    Skin care  · Apply cool compresses to the affected areas.  · Do not scratch or rub your skin.  · Avoid covering the rash. Make sure the rash is exposed to air as much as possible.  Managing itching and discomfort  · Avoid hot showers or baths, which can make itching worse. A cold shower may help.  · Try taking a bath with:  ? Epsom salts. Follow  instructions on the packaging. You can get these at your local pharmacy or grocery store.  ? Baking soda. Pour a small amount into the bath as told by your health care provider.  ? Colloidal oatmeal. Follow  instructions on the packaging. You can get this at your local pharmacy or grocery store.  · Try applying baking soda paste to your skin. Stir water into baking soda until it reaches a paste-like consistency.  · Try applying calamine lotion. This is an  "over-the-counter lotion that helps to relieve itchiness.  · Keep cool and out of the sun. Sweating and being hot can make itching worse.  General instructions    · Rest as needed.  · Drink enough fluid to keep your urine pale yellow.  · Wear loose-fitting clothing.  · Avoid scented soaps, detergents, and perfumes. Use gentle soaps, detergents, perfumes, and other cosmetic products.  · Avoid any substance that causes your rash. Keep a journal to help track what causes your rash. Write down:  ? What you eat.  ? What cosmetic products you use.  ? What you drink.  ? What you wear. This includes jewelry.  · Keep all follow-up visits as told by your health care provider. This is important.  Contact a health care provider if:  · You sweat at night.  · You lose weight.  · You urinate more than normal.  · You urinate less than normal, or you notice that your urine is a darker color than usual.  · You feel weak.  · You vomit.  · Your skin or the whites of your eyes look yellow (jaundice).  · Your skin:  ? Tingles.  ? Is numb.  · Your rash:  ? Does not go away after several days.  ? Gets worse.  · You are:  ? Unusually thirsty.  ? More tired than normal.  · You have:  ? New symptoms.  ? Pain in your abdomen.  ? A fever.  ? Diarrhea.  Get help right away if you:  · Have a fever and your symptoms suddenly get worse.  · Develop confusion.  · Have a severe headache or a stiff neck.  · Have severe joint pains or stiffness.  · Have a seizure.  · Develop a rash that covers all or most of your body. The rash may or may not be painful.  · Develop blisters that:  ? Are on top of the rash.  ? Grow larger or grow together.  ? Are painful.  ? Are inside your nose or mouth.  · Develop a rash that:  ? Looks like purple pinprick-sized spots all over your body.  ? Has a \"bull's eye\" or looks like a target.  ? Is not related to sun exposure, is red and painful, and causes your skin to peel.  Summary  · A rash is a change in the color of your " skin. Some rashes disappear after a few days, but some may last for a few weeks.  · The goal of treatment is to stop the itching and keep the rash from spreading.  · Take or apply over-the-counter and prescription medicines only as told by your health care provider.  · Contact a health care provider if you have new or worsening symptoms.  · Keep all follow-up visits as told by your health care provider. This is important.  This information is not intended to replace advice given to you by your health care provider. Make sure you discuss any questions you have with your health care provider.  Document Released: 12/08/2003 Document Revised: 07/22/2019 Document Reviewed: 07/22/2019  [employer] Interactive Patient Education © 2020 [employer]

## 2020-07-22 DIAGNOSIS — F17.210 CIGARETTE SMOKER: Primary | ICD-10-CM

## 2020-07-22 RX ORDER — BUPROPION HYDROCHLORIDE 150 MG/1
150 TABLET, EXTENDED RELEASE ORAL 2 TIMES DAILY
Qty: 60 TABLET | Refills: 6 | Status: SHIPPED | OUTPATIENT
Start: 2020-07-22 | End: 2023-03-24

## 2021-03-16 ENCOUNTER — BULK ORDERING (OUTPATIENT)
Dept: CASE MANAGEMENT | Facility: OTHER | Age: 61
End: 2021-03-16

## 2021-03-16 DIAGNOSIS — Z23 IMMUNIZATION DUE: ICD-10-CM

## 2023-03-24 ENCOUNTER — OFFICE VISIT (OUTPATIENT)
Dept: FAMILY MEDICINE CLINIC | Facility: CLINIC | Age: 63
End: 2023-03-24
Payer: COMMERCIAL

## 2023-03-24 VITALS
RESPIRATION RATE: 18 BRPM | DIASTOLIC BLOOD PRESSURE: 82 MMHG | OXYGEN SATURATION: 98 % | BODY MASS INDEX: 21.09 KG/M2 | HEART RATE: 82 BPM | SYSTOLIC BLOOD PRESSURE: 132 MMHG | HEIGHT: 70 IN

## 2023-03-24 DIAGNOSIS — J02.9 SORE THROAT: Primary | ICD-10-CM

## 2023-03-24 DIAGNOSIS — R05.1 ACUTE COUGH: ICD-10-CM

## 2023-03-24 DIAGNOSIS — R06.2 WHEEZING: ICD-10-CM

## 2023-03-24 LAB
EXPIRATION DATE: NORMAL
FLUAV AG UPPER RESP QL IA.RAPID: NOT DETECTED
FLUBV AG UPPER RESP QL IA.RAPID: NOT DETECTED
INTERNAL CONTROL: NORMAL
Lab: NORMAL
SARS-COV-2 AG UPPER RESP QL IA.RAPID: NOT DETECTED

## 2023-03-24 PROCEDURE — 99213 OFFICE O/P EST LOW 20 MIN: CPT | Performed by: NURSE PRACTITIONER

## 2023-03-24 PROCEDURE — 87428 SARSCOV & INF VIR A&B AG IA: CPT | Performed by: NURSE PRACTITIONER

## 2023-03-24 RX ORDER — BENZONATATE 100 MG/1
100 CAPSULE ORAL 3 TIMES DAILY PRN
Qty: 30 CAPSULE | Refills: 0 | Status: SHIPPED | OUTPATIENT
Start: 2023-03-24 | End: 2023-04-03

## 2023-03-24 RX ORDER — ALBUTEROL SULFATE 90 UG/1
2 AEROSOL, METERED RESPIRATORY (INHALATION) EVERY 4 HOURS PRN
Qty: 18 G | Refills: 2 | Status: SHIPPED | OUTPATIENT
Start: 2023-03-24

## 2023-03-24 RX ORDER — ERGOCALCIFEROL (VITAMIN D2) 10 MCG
400 TABLET ORAL DAILY
COMMUNITY

## 2023-03-24 RX ORDER — FLUTICASONE PROPIONATE 50 MCG
2 SPRAY, SUSPENSION (ML) NASAL DAILY
COMMUNITY

## 2023-03-24 NOTE — PROGRESS NOTES
Subjective   Sofia Mendez is a 62 y.o. female.     History of Present Illness   Patient presents with c/o cough and sore throat X 3 days. She reports that her symptoms started Tuesday and today her throat became sore. Patient is taking mucinex, which I advised her to stop. Patient takes daily allergy medication. She reports that she also took some dayquil and nightquil.  Patient is a some day smoker. Patient denies any fever.     The following portions of the patient's history were reviewed and updated as appropriate: allergies, current medications, past family history, past medical history, past social history, past surgical history and problem list.    Review of Systems   Constitutional: Negative for appetite change, chills, fatigue and fever.   HENT: Positive for postnasal drip and sore throat. Negative for congestion, ear pain, rhinorrhea, sinus pressure and sneezing.    Eyes: Negative for redness and itching.   Respiratory: Positive for cough. Negative for chest tightness, shortness of breath and wheezing.    Cardiovascular: Negative for chest pain, palpitations and leg swelling.   Musculoskeletal: Negative for myalgias.   Allergic/Immunologic: Negative.    Neurological: Negative for dizziness and headache.       Objective   Physical Exam  Vitals and nursing note reviewed.   Constitutional:       Appearance: She is well-developed.   HENT:      Head: Normocephalic and atraumatic.      Right Ear: Tympanic membrane, ear canal and external ear normal. Tympanic membrane is not scarred, perforated, erythematous or retracted.      Left Ear: Tympanic membrane, ear canal and external ear normal. Tympanic membrane is not scarred, perforated or erythematous.      Ears:      Comments: Scant amount of fluid to middle ear with inspection     Nose: Nose normal.      Mouth/Throat:      Lips: Pink.      Pharynx: Oropharyngeal exudate present.      Tonsils: No tonsillar exudate.      Comments: Post nasal drainage noted.   Eyes:       General:         Right eye: No discharge.         Left eye: No discharge.      Conjunctiva/sclera: Conjunctivae normal.      Pupils: Pupils are equal, round, and reactive to light.   Neck:      Thyroid: No thyromegaly.   Cardiovascular:      Rate and Rhythm: Normal rate and regular rhythm.      Heart sounds: Normal heart sounds. No murmur heard.  Pulmonary:      Effort: Pulmonary effort is normal. No tachypnea or respiratory distress.      Breath sounds: Normal breath sounds. No decreased breath sounds, wheezing or rales.   Musculoskeletal:      Cervical back: Normal range of motion and neck supple.   Lymphadenopathy:      Cervical: No cervical adenopathy.   Skin:     General: Skin is warm and dry.   Neurological:      Mental Status: She is alert and oriented to person, place, and time.   Psychiatric:         Behavior: Behavior normal.         Thought Content: Thought content normal.         Judgment: Judgment normal.         Vitals:    03/24/23 1511   BP: 132/82   Pulse: 82   Resp: 18   SpO2: 98%     Body mass index is 21.09 kg/m².      Procedures    Assessment & Plan   Problems Addressed this Visit    None  Visit Diagnoses     Sore throat    -  Primary    Relevant Orders    POCT SARS-CoV-2 Antigen MARLEN    Acute cough        Relevant Orders    POCT SARS-CoV-2 Antigen MARLEN    Wheezing        Relevant Medications    albuterol sulfate  (90 Base) MCG/ACT inhaler      Diagnoses       Codes Comments    Sore throat    -  Primary ICD-10-CM: J02.9  ICD-9-CM: 462     Acute cough     ICD-10-CM: R05.1  ICD-9-CM: 786.2     Wheezing     ICD-10-CM: R06.2  ICD-9-CM: 786.07         POCT sars and Flu  Continue to take nyquil/dayqil  Tessalon perles 100mg 1p.o. TID cough  Albuterol inhaler 2 puffs Q4H PRN wheezing  Encouraged smoking cessation  Stop mucinex         No follow-ups on file.

## 2023-03-28 ENCOUNTER — TELEPHONE (OUTPATIENT)
Dept: FAMILY MEDICINE CLINIC | Facility: CLINIC | Age: 63
End: 2023-03-28

## 2023-03-28 ENCOUNTER — TELEPHONE (OUTPATIENT)
Dept: FAMILY MEDICINE CLINIC | Facility: CLINIC | Age: 63
End: 2023-03-28
Payer: COMMERCIAL

## 2023-03-28 RX ORDER — AZITHROMYCIN 250 MG/1
TABLET, FILM COATED ORAL
Qty: 6 TABLET | Refills: 0 | Status: SHIPPED | OUTPATIENT
Start: 2023-03-28

## 2023-03-28 RX ORDER — DEXTROMETHORPHAN HYDROBROMIDE AND PROMETHAZINE HYDROCHLORIDE 15; 6.25 MG/5ML; MG/5ML
5 SYRUP ORAL 4 TIMES DAILY PRN
Qty: 240 ML | Refills: 0 | Status: SHIPPED | OUTPATIENT
Start: 2023-03-28

## 2023-03-28 NOTE — TELEPHONE ENCOUNTER
Informed her that you were sending in a zpack and she stated that the perles are not helping her cough either.

## 2023-03-28 NOTE — TELEPHONE ENCOUNTER
Caller: Sofia Mendez    Relationship: Self    Best call back number: [phone]    What is the best time to reach you: ANYTIME   Who are you requesting to speak with (clinical staff, provider,  specific staff member): CLINICAL STAFF  Do you know the name of the person who called: SELF   What was the call regarding:PLEASE CALL PATIENT ASAP! SHE CALLED AND STATED SHE NOW HAS A FEVER 102. THANKS   Do you require a callback: YES

## 2023-03-28 NOTE — TELEPHONE ENCOUNTER
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 THANKS   Do you require a callback: YES

## 2023-03-28 NOTE — TELEPHONE ENCOUNTER
PATIENT TEARFUL, ADVISED THAT  STAFF TELLS ME THAT PROVIDER IS STILL WITH PATIENTS WILL CALL IN AS SOON AS GETS A CHANCE LIKELY IN 30 MIN.    PATIENT TEARFUL    REQUESTS A CALL WHEN MEDICATION HAS BEEN CALLED IN TO PHARMACY IS WAITING IN CAR IN PARKING LOT OF PHARMACY.

## 2023-03-28 NOTE — TELEPHONE ENCOUNTER
Spoke w/ pt  Temp is not 102 but rather 100.  ANA polo and phenergan DM sent to pharmacy.  Pt advised to go to ER if sx worsen or fail to improve

## 2023-04-27 ENCOUNTER — OFFICE VISIT (OUTPATIENT)
Dept: FAMILY MEDICINE CLINIC | Facility: CLINIC | Age: 63
End: 2023-04-27
Payer: COMMERCIAL

## 2023-04-27 VITALS
SYSTOLIC BLOOD PRESSURE: 120 MMHG | WEIGHT: 149.4 LBS | BODY MASS INDEX: 21.39 KG/M2 | RESPIRATION RATE: 24 BRPM | HEART RATE: 63 BPM | DIASTOLIC BLOOD PRESSURE: 74 MMHG | OXYGEN SATURATION: 100 % | HEIGHT: 70 IN

## 2023-04-27 DIAGNOSIS — L23.7 POISON IVY DERMATITIS: Primary | ICD-10-CM

## 2023-04-27 RX ORDER — PREDNISONE 20 MG/1
20 TABLET ORAL DAILY
Qty: 5 TABLET | Refills: 0 | Status: SHIPPED | OUTPATIENT
Start: 2023-04-27 | End: 2023-05-02

## 2023-04-27 RX ORDER — TRIAMCINOLONE ACETONIDE 1 MG/G
1 CREAM TOPICAL 2 TIMES DAILY
Qty: 45 G | Refills: 0 | Status: SHIPPED | OUTPATIENT
Start: 2023-04-27 | End: 2023-05-20

## 2023-04-27 NOTE — PROGRESS NOTES
Subjective   Sofia Mendez is a 62 y.o. female.     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  Patient has been gardening.  She has been using over-the-counter calamine lotion.  Patient is struggling to get sleep due to itching.  She denies any wheezing or shortness of breath.    The following portions of the patient's history were reviewed and updated as appropriate: allergies, current medications, past family history, past medical history, past social history, past surgical history and problem list.    Review of Systems    Objective   Physical Exam  Constitutional:       Appearance: Normal appearance.   HENT:      Mouth/Throat:      Mouth: Mucous membranes are moist.   Cardiovascular:      Rate and Rhythm: Normal rate and regular rhythm.      Pulses: Normal pulses.      Heart sounds: Normal heart sounds.   Pulmonary:      Effort: Pulmonary effort is normal.      Breath sounds: Normal breath sounds. No wheezing.   Musculoskeletal:         General: Normal range of motion.   Skin:     Findings: Rash present.          Neurological:      General: No focal deficit present.      Mental Status: She is alert.   Psychiatric:         Mood and Affect: Mood is anxious.         Vitals:    04/27/23 1331   BP: 120/74   Pulse: 63   Resp: 24   SpO2: 100%     Body mass index is 21.44 kg/m².    Procedures    Assessment & Plan   Problems Addressed this Visit    None  Visit Diagnoses     Poison ivy dermatitis    -  Primary    Relevant Medications    triamcinolone (KENALOG) 0.1 % cream      Diagnoses       Codes Comments    Poison ivy dermatitis    -  Primary ICD-10-CM: L23.7  ICD-9-CM: 692.6         Poison ivy dermatitis-Rx triamcinolone cream 0.1 for itching, use calamine, oatmeal baths, may take Benadryl at night, Rx prednisone 20 mg daily x5 days         Education provided in AVS   No follow-ups on file.

## 2023-10-09 ENCOUNTER — OFFICE VISIT (OUTPATIENT)
Dept: FAMILY MEDICINE CLINIC | Facility: CLINIC | Age: 63
End: 2023-10-09
Payer: COMMERCIAL

## 2023-10-09 VITALS — WEIGHT: 140 LBS | HEIGHT: 70 IN | RESPIRATION RATE: 18 BRPM | BODY MASS INDEX: 20.04 KG/M2

## 2023-10-09 DIAGNOSIS — U07.1 COVID: ICD-10-CM

## 2023-10-09 DIAGNOSIS — R05.1 ACUTE COUGH: Primary | ICD-10-CM

## 2023-10-09 PROCEDURE — 99214 OFFICE O/P EST MOD 30 MIN: CPT | Performed by: NURSE PRACTITIONER

## 2023-10-09 RX ORDER — BENZONATATE 100 MG/1
100 CAPSULE ORAL 3 TIMES DAILY PRN
Qty: 30 CAPSULE | Refills: 0 | Status: SHIPPED | OUTPATIENT
Start: 2023-10-09 | End: 2023-10-19

## 2023-10-09 NOTE — PROGRESS NOTES
Perla Mendez is a 63 y.o. female.     History of Present Illness   Patient presents with c/o cough and shortness of breath. She reports that she tested for covid 2 hours prior to coming to our office today. She reports that her original symptoms started about 4 weeks ago and she was treated with medrol pack at  about 2 weeks ago.  She recently traveled to [place] and was told by a friend that she had tested positive, so patient tested today and was positive. She reports that this is not why she is here. She does have an albuterol inhaler at home that she was given for wheezing.     The following portions of the patient's history were reviewed and updated as appropriate: allergies, current medications, past family history, past medical history, past social history, past surgical history and problem list.    Review of Systems   Constitutional:  Negative for appetite change, chills, fatigue and fever.   HENT:  Positive for postnasal drip. Negative for congestion, ear pain, rhinorrhea, sinus pressure, sneezing and sore throat.    Eyes:  Negative for redness and itching.   Respiratory:  Positive for cough and wheezing. Negative for chest tightness and shortness of breath.    Cardiovascular:  Negative for chest pain, palpitations and leg swelling.   Musculoskeletal:  Negative for myalgias.   Allergic/Immunologic: Negative.    Neurological:  Negative for dizziness and headache.       Objective   Physical Exam  Vitals and nursing note reviewed.   Constitutional:       Appearance: Normal appearance. She is well-developed and normal weight.   HENT:      Head: Normocephalic and atraumatic.      Right Ear: Tympanic membrane, ear canal and external ear normal.      Left Ear: Tympanic membrane, ear canal and external ear normal.      Nose: Nose normal.      Right Sinus: No maxillary sinus tenderness or frontal sinus tenderness.      Left Sinus: No maxillary sinus tenderness or frontal sinus tenderness.       Mouth/Throat:      Lips: Pink.      Pharynx: No oropharyngeal exudate.      Comments: Postnasal drainage noted.   Eyes:      General:         Right eye: No discharge.         Left eye: No discharge.      Conjunctiva/sclera: Conjunctivae normal.      Pupils: Pupils are equal, round, and reactive to light.   Neck:      Thyroid: No thyromegaly.   Cardiovascular:      Rate and Rhythm: Normal rate and regular rhythm.      Heart sounds: Normal heart sounds. No murmur heard.  Pulmonary:      Effort: Pulmonary effort is normal. No tachypnea or respiratory distress.      Breath sounds: Normal breath sounds. No decreased breath sounds, wheezing or rales.   Musculoskeletal:      Cervical back: Normal range of motion and neck supple.   Lymphadenopathy:      Cervical: No cervical adenopathy.   Skin:     General: Skin is warm and dry.   Neurological:      Mental Status: She is alert and oriented to person, place, and time.   Psychiatric:         Behavior: Behavior normal.         Thought Content: Thought content normal.         Judgment: Judgment normal.         Vitals:    10/09/23 1526   Resp: 18     Body mass index is 20.09 kg/mý.      Procedures    Assessment & Plan   Problems Addressed this Visit    None  Visit Diagnoses       Acute cough    -  Primary    Relevant Medications    benzonatate (Tessalon Perles) 100 MG capsule    COVID              Diagnoses         Codes Comments    Acute cough    -  Primary ICD-10-CM: R05.1  ICD-9-CM: 786.2     COVID     ICD-10-CM: U07.1  ICD-9-CM: 079.89           Patient has cough medication that was prescribed by   Advised patient to use albuterol inhaler  Tessalon perrles 100 mg 1 p.o. TID PRN  Do not take tessalon perrles and cough medication prescribed previously at the same time.   Patient advised to take over the counter cold and flu medication for her symptoms.        Return if symptoms worsen or fail to improve.

## 2023-10-09 NOTE — PATIENT INSTRUCTIONS
Tessalon perrles 100 mg 1 p.o. TID PRN  Do not take tessalon perrles and cough medication prescribed previously at the same time.   Patient advised to take over the counter cold and flu medication for her symptoms.  Return if symptoms worsen or fail to improve.

## 2023-11-09 ENCOUNTER — TELEPHONE (OUTPATIENT)
Dept: FAMILY MEDICINE CLINIC | Facility: CLINIC | Age: 63
End: 2023-11-09
Payer: COMMERCIAL

## 2023-11-09 NOTE — TELEPHONE ENCOUNTER
Patient called c/o chronic cough. She was previously seen by Adriana 10/9/23, but says symptoms are still persisting and have not improved. She is requesting to see Dr. Benoit. Her first availability is showing 12/21/23. Patient is requesting to be seen sooner. Can you work her in anywhere?

## 2023-12-19 ENCOUNTER — TELEPHONE (OUTPATIENT)
Dept: FAMILY MEDICINE CLINIC | Facility: CLINIC | Age: 63
End: 2023-12-19
Payer: COMMERCIAL

## 2023-12-19 NOTE — TELEPHONE ENCOUNTER
PATIENT CALLED AND STATES SHE IS IN THE [facility] WITH BLOOD CLOT IN LEFT FOOT. SHE IS GETTING DIFFERENT DIAGNOSIS FROM DIFFERENT DOCTORS.    SHE IS WANTING TO SEE IF DR. KAUR CAN LOOK AND SEE WHAT SHE THINKS. SHE STATES SHE HAS A STAPH INFECTION IN HER FOOT. WILL NEED ANOTHER SURGERY REMOVE BASKET FOR BLOOD CLOT. NOT ABLE TO BARE WEIGHT ON FOOT    SHE DOES NOT KNOW WHEN SHE WILL BE DISCHARGED.   PLEASE CALL AND ADVISE [phone]

## 2023-12-26 NOTE — TELEPHONE ENCOUNTER
Spoke with Dr. Bland to provide patient advise on next steps. Dr. Bland agreed with patient to d/c keflex and hydroxyzine and take 2 benadryl with advise that patient may become sleepy, in hopes to relieve itching. Patient offered appointment to come in tomorrow for rash to be evaluated. Patient agreed to this.

## 2023-12-26 NOTE — TELEPHONE ENCOUNTER
Patient called to speak to a provider. Patient states she was sent home from [facility] on 12/21 after being admitted for several days. Patient left with keflex, percocet, eliquis, and hydroxyzine. Patient reports a rash has since spread to several areas of her body, believing this could be an allergic reaction to one of her medications, and has since stopped the keflex and the hydroxyzine. Per the patient, the eliquis and percocet have not helped relieve her symptoms much.    Patient asking if she can take benadryl with her eliquis and percocet. Advised patient that her PCP is out of office and another provider will have to advise her on the next steps.     Please advise.

## 2023-12-27 ENCOUNTER — OFFICE VISIT (OUTPATIENT)
Dept: FAMILY MEDICINE CLINIC | Facility: CLINIC | Age: 63
End: 2023-12-27
Payer: COMMERCIAL

## 2023-12-27 VITALS
HEART RATE: 98 BPM | BODY MASS INDEX: 19.33 KG/M2 | OXYGEN SATURATION: 99 % | SYSTOLIC BLOOD PRESSURE: 122 MMHG | HEIGHT: 70 IN | DIASTOLIC BLOOD PRESSURE: 78 MMHG | WEIGHT: 135 LBS | RESPIRATION RATE: 18 BRPM

## 2023-12-27 DIAGNOSIS — N17.9 AKI (ACUTE KIDNEY INJURY): ICD-10-CM

## 2023-12-27 DIAGNOSIS — L50.9 URTICARIAL RASH: Primary | ICD-10-CM

## 2023-12-27 DIAGNOSIS — D65 CONSUMPTIVE COAGULOPATHY: ICD-10-CM

## 2023-12-27 PROBLEM — D69.6 THROMBOCYTOPENIA: Status: ACTIVE | Noted: 2023-12-13

## 2023-12-27 PROBLEM — I82.4Z9 ACUTE DEEP VEIN THROMBOSIS (DVT) OF DISTAL VEIN OF LOWER EXTREMITY: Status: ACTIVE | Noted: 2023-12-13

## 2023-12-27 PROBLEM — A41.9 SEPSIS: Status: ACTIVE | Noted: 2023-12-13

## 2023-12-27 NOTE — PROGRESS NOTES
"Chief Complaint  Rash    Subjective    History of Present Illness {  Problem List  Visit  Diagnosis   Encounters  Notes  Medications  Labs  Result Review Imaging  Media :23}     Sofia Mendez presents to [facility] for Rash.  History of Present Illness     Here today with concern for a new onset papular urticarial rash on her back, torso, pelvis, and legs. Was admitted earlier in December with a fairly significant vasculitic rash on her left lower extremity thought to be caused by E. coli sepsis. Was found to have a left lower extremity DVT as well as consumptive coagulopathy. Was treated at [facility] in the ICU and stabilized with fluids and broad-spectrum antibiotics. Was seen by a number of specialists while inpatient and is currently waiting for a follow-up visit with hematology. Due to significant thrombocytopenia was unable to be anticoagulated initially. As her platelets normalized she was placed on Eliquis and discharged on cephalexin, hydroxyzine, and Percocet. Stop the cephalexin initially with the onset of rash which started a few days ago. However took a dose again last night and this morning without any real change in the rash. Has been taking hydroxyzine until yesterday at which point she switched to Benadryl as she felt that that worked better. Has remained on the apixaban throughout. Is currently afebrile with reasonable blood pressure.    Objective     Vital Signs:   /78   Pulse 98   Resp 18   Ht 177.8 cm (70\")   Wt 61.2 kg (135 lb)   SpO2 99%   BMI 19.37 kg/m²   Physical Exam  Vitals and nursing note reviewed.   Constitutional:       General: She is not in acute distress.     Appearance: Normal appearance. She is not ill-appearing.   Cardiovascular:      Rate and Rhythm: Normal rate and regular rhythm.      Pulses: Normal pulses.      Heart sounds: Normal heart sounds. No murmur heard.  Pulmonary:      Effort: Pulmonary effort is normal. No " respiratory distress.      Breath sounds: Normal breath sounds. No rales.   Abdominal:      General: Abdomen is flat. Bowel sounds are normal.      Palpations: Abdomen is soft.      Tenderness: There is no abdominal tenderness.   Musculoskeletal:      Comments: L calf tender on compression   Skin:     Comments: Healing bullae and ecchymosis/vascular lesions on LLE, specifically on the dorsum of the L foot and between the 3rd and 4th toes.    Neurological:      Mental Status: She is alert and oriented to person, place, and time. Mental status is at baseline.   Psychiatric:         Mood and Affect: Mood normal. Mood is anxious. Affect is tearful.         Behavior: Behavior normal.        Result Review  Data Reviewed:{ Labs  Result Review  Imaging  Med Tab  Media :23}                 Assessment and Plan {CC Problem List  Visit Diagnosis  ROS  Review (Popup)  Health Maintenance  Quality  BestPractice  Medications  SmartSets  SnapShot Encounters  Media :23}   Diagnoses and all orders for this visit:    1. Urticarial rash (Primary)  -     Comprehensive Metabolic Panel  -     CBC & Differential    2. Consumptive coagulopathy  -     CBC & Differential    3. JAMAL (acute kidney injury)  -     Comprehensive Metabolic Panel    Cause of rash is unknown at this time. Like to get some blood work as above for further evaluation. Could very well be an allergic reaction to the cephalexin however the fact that she has taken 2 additional doses without worsening would potentially work against that therapy. We also be that she has ongoing low platelets that are somehow contributing. I will contact her with labs and I strongly recommended she reach out to hematology to get an appointment ASAP. Discussed taking additional diphenhydramine to try to help with the rash and continue with the rest of her regimen as prescribed. We discussed signs and symptoms that would warrant return to the emergency room for further evaluation.  Recommended close follow-up in the short-term.    Recommended follow up as below. Encouraged communication via MyChart in the meantime.     I spent 30 minutes face-to-face with patient, reviewing chart, coordinating care, and documenting in the chart.      Patient was given instructions and counseling regarding her condition or for health maintenance advice. Please see specific information pulled into the AVS (placed there by myself) if appropriate.    Return if symptoms worsen or fail to improve.    YUDELKA Bland MD

## 2023-12-28 LAB
ALBUMIN SERPL-MCNC: 3.4 G/DL (ref 3.5–5.2)
ALBUMIN/GLOB SERPL: 0.8 G/DL
ALP SERPL-CCNC: 105 U/L (ref 39–117)
ALT SERPL-CCNC: 29 U/L (ref 1–33)
AST SERPL-CCNC: 31 U/L (ref 1–32)
BASOPHILS # BLD AUTO: 0.1 10*3/MM3 (ref 0–0.2)
BASOPHILS NFR BLD AUTO: 1.2 % (ref 0–1.5)
BILIRUB SERPL-MCNC: 0.7 MG/DL (ref 0–1.2)
BUN SERPL-MCNC: 18 MG/DL (ref 8–23)
BUN/CREAT SERPL: 22.8 (ref 7–25)
CALCIUM SERPL-MCNC: 8.9 MG/DL (ref 8.6–10.5)
CHLORIDE SERPL-SCNC: 104 MMOL/L (ref 98–107)
CO2 SERPL-SCNC: 24.2 MMOL/L (ref 22–29)
CREAT SERPL-MCNC: 0.79 MG/DL (ref 0.57–1)
EGFRCR SERPLBLD CKD-EPI 2021: 84.2 ML/MIN/1.73
EOSINOPHIL # BLD AUTO: 0.3 10*3/MM3 (ref 0–0.4)
EOSINOPHIL NFR BLD AUTO: 3.7 % (ref 0.3–6.2)
ERYTHROCYTE [DISTWIDTH] IN BLOOD BY AUTOMATED COUNT: 12.1 % (ref 12.3–15.4)
GLOBULIN SER CALC-MCNC: 4.3 GM/DL
GLUCOSE SERPL-MCNC: 99 MG/DL (ref 65–99)
HCT VFR BLD AUTO: 31.9 % (ref 34–46.6)
HGB BLD-MCNC: 10.2 G/DL (ref 12–15.9)
IMM GRANULOCYTES # BLD AUTO: 0.03 10*3/MM3 (ref 0–0.05)
IMM GRANULOCYTES NFR BLD AUTO: 0.4 % (ref 0–0.5)
LYMPHOCYTES # BLD AUTO: 2.69 10*3/MM3 (ref 0.7–3.1)
LYMPHOCYTES NFR BLD AUTO: 33.3 % (ref 19.6–45.3)
MCH RBC QN AUTO: 29.7 PG (ref 26.6–33)
MCHC RBC AUTO-ENTMCNC: 32 G/DL (ref 31.5–35.7)
MCV RBC AUTO: 93 FL (ref 79–97)
MONOCYTES # BLD AUTO: 0.8 10*3/MM3 (ref 0.1–0.9)
MONOCYTES NFR BLD AUTO: 9.9 % (ref 5–12)
NEUTROPHILS # BLD AUTO: 4.15 10*3/MM3 (ref 1.7–7)
NEUTROPHILS NFR BLD AUTO: 51.5 % (ref 42.7–76)
NRBC BLD AUTO-RTO: 0 /100 WBC (ref 0–0.2)
PLATELET # BLD AUTO: 525 10*3/MM3 (ref 140–450)
POTASSIUM SERPL-SCNC: 3.9 MMOL/L (ref 3.5–5.2)
PROT SERPL-MCNC: 7.7 G/DL (ref 6–8.5)
RBC # BLD AUTO: 3.43 10*6/MM3 (ref 3.77–5.28)
SODIUM SERPL-SCNC: 140 MMOL/L (ref 136–145)
WBC # BLD AUTO: 8.07 10*3/MM3 (ref 3.4–10.8)

## 2023-12-29 DIAGNOSIS — D65 CONSUMPTIVE COAGULOPATHY: Primary | ICD-10-CM

## 2023-12-29 DIAGNOSIS — D69.6 THROMBOCYTOPENIA: ICD-10-CM

## 2024-01-04 ENCOUNTER — APPOINTMENT (OUTPATIENT)
Dept: LAB | Facility: HOSPITAL | Age: 64
End: 2024-01-04
Payer: COMMERCIAL

## 2024-01-04 ENCOUNTER — CONSULT (OUTPATIENT)
Dept: ONCOLOGY | Facility: CLINIC | Age: 64
End: 2024-01-04
Payer: COMMERCIAL

## 2024-01-04 VITALS
DIASTOLIC BLOOD PRESSURE: 78 MMHG | TEMPERATURE: 98 F | BODY MASS INDEX: 19.26 KG/M2 | HEART RATE: 95 BPM | RESPIRATION RATE: 18 BRPM | WEIGHT: 134.5 LBS | OXYGEN SATURATION: 98 % | SYSTOLIC BLOOD PRESSURE: 131 MMHG | HEIGHT: 70 IN

## 2024-01-04 DIAGNOSIS — I82.402 ACUTE DEEP VEIN THROMBOSIS (DVT) OF LEFT LOWER EXTREMITY, UNSPECIFIED VEIN: Primary | ICD-10-CM

## 2024-01-04 DIAGNOSIS — E87.6 HYPOKALEMIA: ICD-10-CM

## 2024-01-04 DIAGNOSIS — D65 CONSUMPTIVE COAGULOPATHY: ICD-10-CM

## 2024-01-04 DIAGNOSIS — D69.6 THROMBOCYTOPENIA: ICD-10-CM

## 2024-01-04 DIAGNOSIS — D72.820 LYMPHOCYTOSIS: ICD-10-CM

## 2024-01-04 LAB
ALBUMIN SERPL-MCNC: 3.7 G/DL (ref 3.5–5.2)
ALBUMIN/GLOB SERPL: 0.8 G/DL
ALP SERPL-CCNC: 98 U/L (ref 39–117)
ALT SERPL W P-5'-P-CCNC: 30 U/L (ref 1–33)
ANION GAP SERPL CALCULATED.3IONS-SCNC: 10.8 MMOL/L (ref 5–15)
APTT PPP: 31.9 SECONDS (ref 22.7–35.4)
AST SERPL-CCNC: 34 U/L (ref 1–32)
BASOPHILS # BLD AUTO: 0.09 10*3/MM3 (ref 0–0.2)
BASOPHILS NFR BLD AUTO: 1 % (ref 0–1.5)
BILIRUB SERPL-MCNC: 0.6 MG/DL (ref 0–1.2)
BUN SERPL-MCNC: 15 MG/DL (ref 8–23)
BUN/CREAT SERPL: 17.9 (ref 7–25)
CALCIUM SPEC-SCNC: 9.1 MG/DL (ref 8.6–10.5)
CHLORIDE SERPL-SCNC: 102 MMOL/L (ref 98–107)
CO2 SERPL-SCNC: 27.2 MMOL/L (ref 22–29)
CREAT SERPL-MCNC: 0.84 MG/DL (ref 0.57–1)
DEPRECATED RDW RBC AUTO: 46.6 FL (ref 37–54)
EGFRCR SERPLBLD CKD-EPI 2021: 78.2 ML/MIN/1.73
EOSINOPHIL # BLD AUTO: 0.45 10*3/MM3 (ref 0–0.4)
EOSINOPHIL NFR BLD AUTO: 5 % (ref 0.3–6.2)
ERYTHROCYTE [DISTWIDTH] IN BLOOD BY AUTOMATED COUNT: 13.2 % (ref 12.3–15.4)
FIBRINOGEN PPP-MCNC: 659 MG/DL (ref 219–464)
GLOBULIN UR ELPH-MCNC: 4.8 GM/DL
GLUCOSE SERPL-MCNC: 103 MG/DL (ref 65–99)
HCT VFR BLD AUTO: 39.5 % (ref 34–46.6)
HGB BLD-MCNC: 12.5 G/DL (ref 12–15.9)
IMM GRANULOCYTES # BLD AUTO: 0.04 10*3/MM3 (ref 0–0.05)
IMM GRANULOCYTES NFR BLD AUTO: 0.4 % (ref 0–0.5)
INR PPP: 1.15 (ref 0.9–1.1)
LYMPHOCYTES # BLD AUTO: 3.54 10*3/MM3 (ref 0.7–3.1)
LYMPHOCYTES NFR BLD AUTO: 39 % (ref 19.6–45.3)
MCH RBC QN AUTO: 30.6 PG (ref 26.6–33)
MCHC RBC AUTO-ENTMCNC: 31.6 G/DL (ref 31.5–35.7)
MCV RBC AUTO: 96.8 FL (ref 79–97)
MONOCYTES # BLD AUTO: 0.66 10*3/MM3 (ref 0.1–0.9)
MONOCYTES NFR BLD AUTO: 7.3 % (ref 5–12)
NEUTROPHILS NFR BLD AUTO: 4.29 10*3/MM3 (ref 1.7–7)
NEUTROPHILS NFR BLD AUTO: 47.3 % (ref 42.7–76)
NRBC BLD AUTO-RTO: 0 /100 WBC (ref 0–0.2)
PLATELET # BLD AUTO: 420 10*3/MM3 (ref 140–450)
PMV BLD AUTO: 8.1 FL (ref 6–12)
POTASSIUM SERPL-SCNC: 3.1 MMOL/L (ref 3.5–5.2)
PROT SERPL-MCNC: 8.5 G/DL (ref 6–8.5)
PROTHROMBIN TIME: 14.9 SECONDS (ref 11.7–14.2)
RBC # BLD AUTO: 4.08 10*6/MM3 (ref 3.77–5.28)
SODIUM SERPL-SCNC: 140 MMOL/L (ref 136–145)
WBC NRBC COR # BLD AUTO: 9.07 10*3/MM3 (ref 3.4–10.8)

## 2024-01-04 PROCEDURE — 85730 THROMBOPLASTIN TIME PARTIAL: CPT | Performed by: INTERNAL MEDICINE

## 2024-01-04 PROCEDURE — 85610 PROTHROMBIN TIME: CPT | Performed by: INTERNAL MEDICINE

## 2024-01-04 PROCEDURE — 80053 COMPREHEN METABOLIC PANEL: CPT | Performed by: INTERNAL MEDICINE

## 2024-01-04 PROCEDURE — 85025 COMPLETE CBC W/AUTO DIFF WBC: CPT | Performed by: INTERNAL MEDICINE

## 2024-01-04 PROCEDURE — 85384 FIBRINOGEN ACTIVITY: CPT | Performed by: INTERNAL MEDICINE

## 2024-01-04 PROCEDURE — 85300 ANTITHROMBIN III ACTIVITY: CPT | Performed by: INTERNAL MEDICINE

## 2024-01-04 PROCEDURE — 36415 COLL VENOUS BLD VENIPUNCTURE: CPT | Performed by: INTERNAL MEDICINE

## 2024-01-04 PROCEDURE — 85303 CLOT INHIBIT PROT C ACTIVITY: CPT | Performed by: INTERNAL MEDICINE

## 2024-01-04 RX ORDER — CETIRIZINE HYDROCHLORIDE 5 MG/1
5 TABLET ORAL DAILY
COMMUNITY

## 2024-01-04 RX ORDER — POTASSIUM CHLORIDE 750 MG/1
10 CAPSULE, EXTENDED RELEASE ORAL DAILY
Qty: 30 CAPSULE | Refills: 0 | Status: SHIPPED | OUTPATIENT
Start: 2024-01-04

## 2024-01-05 ENCOUNTER — TELEPHONE (OUTPATIENT)
Dept: FAMILY MEDICINE CLINIC | Facility: CLINIC | Age: 64
End: 2024-01-05
Payer: COMMERCIAL

## 2024-01-05 DIAGNOSIS — Z12.31 ENCOUNTER FOR SCREENING MAMMOGRAM FOR MALIGNANT NEOPLASM OF BREAST: Primary | ICD-10-CM

## 2024-01-05 LAB
AT III PPP CHRO-ACNC: 137 % (ref 90–134)
PROT C ACT/NOR PPP: 87 % (ref 86–163)

## 2024-01-05 NOTE — TELEPHONE ENCOUNTER
Patient asking that we fax mammo order to [facility]-[phone].   REVIEW OF SYSTEMS:  Constitutional: fatigue and Generalized weakness - \"getting worse\"  Eye Problem(s):glasses   ENT Problem(s):negative  Cardiovascular problem(s):exertional chest pain or pressure, irregular heart beat, palpitations, shortness of breath on exertion and tachycardia  Respiratory problem(s):shortness of breath with exertion  Gastro-intestinal problem(s):abdominal pain  Genito-urinary problem(s):incontinence  Musculoskeletal problem(s):back pain and arthritis  Integumentary problem(s):negative  Neurological problem(s):negative  Psychiatric problem(s):sexual difficulties  Endocrine problem(s):diabetes  Hematologic and/or Lymphatic problem(s):anemia    Patient does take aspirin 81 mg daily

## 2024-01-06 LAB
LABORATORY COMMENT REPORT: NORMAL
VWF CP ACT/NOR PPP CHRO: 83.6 %

## 2024-01-18 ENCOUNTER — TELEPHONE (OUTPATIENT)
Dept: ONCOLOGY | Facility: CLINIC | Age: 64
End: 2024-01-18
Payer: COMMERCIAL

## 2024-01-18 NOTE — TELEPHONE ENCOUNTER
Provider: Anoop  Caller: patient   Relationship to Patient: self  Call Back Phone Number: [phone]  Reason for Call: Pt calling to see if ok to wear a compression sock?

## 2024-01-29 ENCOUNTER — OFFICE VISIT (OUTPATIENT)
Dept: ONCOLOGY | Facility: CLINIC | Age: 64
End: 2024-01-29
Payer: COMMERCIAL

## 2024-01-29 ENCOUNTER — LAB (OUTPATIENT)
Dept: LAB | Facility: HOSPITAL | Age: 64
End: 2024-01-29
Payer: COMMERCIAL

## 2024-01-29 VITALS
DIASTOLIC BLOOD PRESSURE: 76 MMHG | RESPIRATION RATE: 16 BRPM | BODY MASS INDEX: 19.64 KG/M2 | WEIGHT: 137.2 LBS | HEART RATE: 74 BPM | OXYGEN SATURATION: 97 % | HEIGHT: 70 IN | TEMPERATURE: 97.2 F | SYSTOLIC BLOOD PRESSURE: 116 MMHG

## 2024-01-29 DIAGNOSIS — D69.6 THROMBOCYTOPENIA: ICD-10-CM

## 2024-01-29 DIAGNOSIS — L02.612 FOOT ABSCESS, LEFT: ICD-10-CM

## 2024-01-29 DIAGNOSIS — D65 CONSUMPTIVE COAGULOPATHY: ICD-10-CM

## 2024-01-29 DIAGNOSIS — D72.820 LYMPHOCYTOSIS: ICD-10-CM

## 2024-01-29 DIAGNOSIS — E87.6 HYPOKALEMIA: ICD-10-CM

## 2024-01-29 DIAGNOSIS — I82.402 ACUTE DEEP VEIN THROMBOSIS (DVT) OF LEFT LOWER EXTREMITY, UNSPECIFIED VEIN: Primary | ICD-10-CM

## 2024-01-29 LAB
ALBUMIN SERPL-MCNC: 4 G/DL (ref 3.5–5.2)
ALBUMIN/GLOB SERPL: 1.3 G/DL
ALP SERPL-CCNC: 62 U/L (ref 39–117)
ALT SERPL W P-5'-P-CCNC: 9 U/L (ref 1–33)
ANION GAP SERPL CALCULATED.3IONS-SCNC: 8.5 MMOL/L (ref 5–15)
AST SERPL-CCNC: 20 U/L (ref 1–32)
BASOPHILS # BLD AUTO: 0.06 10*3/MM3 (ref 0–0.2)
BASOPHILS NFR BLD AUTO: 0.7 % (ref 0–1.5)
BILIRUB SERPL-MCNC: 1.2 MG/DL (ref 0–1.2)
BUN SERPL-MCNC: 15 MG/DL (ref 8–23)
BUN/CREAT SERPL: 16.9 (ref 7–25)
CALCIUM SPEC-SCNC: 9.3 MG/DL (ref 8.6–10.5)
CHLORIDE SERPL-SCNC: 104 MMOL/L (ref 98–107)
CO2 SERPL-SCNC: 27.5 MMOL/L (ref 22–29)
CREAT SERPL-MCNC: 0.89 MG/DL (ref 0.57–1)
DEPRECATED RDW RBC AUTO: 50.5 FL (ref 37–54)
EGFRCR SERPLBLD CKD-EPI 2021: 73 ML/MIN/1.73
EOSINOPHIL # BLD AUTO: 0.2 10*3/MM3 (ref 0–0.4)
EOSINOPHIL NFR BLD AUTO: 2.2 % (ref 0.3–6.2)
ERYTHROCYTE [DISTWIDTH] IN BLOOD BY AUTOMATED COUNT: 14.3 % (ref 12.3–15.4)
GLOBULIN UR ELPH-MCNC: 3.2 GM/DL
GLUCOSE SERPL-MCNC: 84 MG/DL (ref 65–99)
HCT VFR BLD AUTO: 40.7 % (ref 34–46.6)
HGB BLD-MCNC: 12.9 G/DL (ref 12–15.9)
IMM GRANULOCYTES # BLD AUTO: 0.02 10*3/MM3 (ref 0–0.05)
IMM GRANULOCYTES NFR BLD AUTO: 0.2 % (ref 0–0.5)
LYMPHOCYTES # BLD AUTO: 4.02 10*3/MM3 (ref 0.7–3.1)
LYMPHOCYTES NFR BLD AUTO: 44.4 % (ref 19.6–45.3)
MCH RBC QN AUTO: 30.9 PG (ref 26.6–33)
MCHC RBC AUTO-ENTMCNC: 31.7 G/DL (ref 31.5–35.7)
MCV RBC AUTO: 97.4 FL (ref 79–97)
MONOCYTES # BLD AUTO: 0.75 10*3/MM3 (ref 0.1–0.9)
MONOCYTES NFR BLD AUTO: 8.3 % (ref 5–12)
NEUTROPHILS NFR BLD AUTO: 4.01 10*3/MM3 (ref 1.7–7)
NEUTROPHILS NFR BLD AUTO: 44.2 % (ref 42.7–76)
NRBC BLD AUTO-RTO: 0 /100 WBC (ref 0–0.2)
PLATELET # BLD AUTO: 268 10*3/MM3 (ref 140–450)
PMV BLD AUTO: 8.5 FL (ref 6–12)
POTASSIUM SERPL-SCNC: 3.9 MMOL/L (ref 3.5–5.2)
PROT SERPL-MCNC: 7.2 G/DL (ref 6–8.5)
RBC # BLD AUTO: 4.18 10*6/MM3 (ref 3.77–5.28)
SODIUM SERPL-SCNC: 140 MMOL/L (ref 136–145)
WBC NRBC COR # BLD AUTO: 9.06 10*3/MM3 (ref 3.4–10.8)

## 2024-01-29 PROCEDURE — 85025 COMPLETE CBC W/AUTO DIFF WBC: CPT

## 2024-01-29 PROCEDURE — 36415 COLL VENOUS BLD VENIPUNCTURE: CPT

## 2024-01-29 PROCEDURE — 99215 OFFICE O/P EST HI 40 MIN: CPT | Performed by: INTERNAL MEDICINE

## 2024-01-29 PROCEDURE — 80053 COMPREHEN METABOLIC PANEL: CPT

## 2024-01-29 NOTE — PROGRESS NOTES
"Subjective     CHIEF COMPLAINT:      Chief Complaint   Patient presents with    Follow-up     Discuss blood clot in lt foot/release for surgery rt hip      HISTORY OF PRESENT ILLNESS:     Sofia Mendez is a 63 y.o. female patient who returns today for follow up on her left lower extremity DVT.  She is on Eliquis 5 mg twice daily.  She is tolerating it without problem with bleeding or bruising.  She reports a gradual improvement in her leg swelling.  There is slow improvement in the left foot.  The area of the bulla has improved but there is a red area in the distal aspect of the foot.  She develops pain when she tries to walk.    Patient needs to have surgery for very right gluteus jean pierre tendon.  She was trying to have it done before the middle of the year.    Patient contacted the vascular surgeon.  They told her that the IVC filter will be removed just before the 6-month jorge.    ROS:  Pertinent ROS is in the HPI.     Past medical, surgical, social and family history were reviewed.     MEDICATIONS:    Current Outpatient Medications:     Acetaminophen (TYLENOL PO), Take 500 mg by mouth As Needed., Disp: , Rfl:     apixaban (ELIQUIS) 5 MG tablet tablet, Take 1 tablet by mouth 2 (Two) Times a Day., Disp: 60 tablet, Rfl: 4    potassium chloride (MICRO-K) 10 MEQ CR capsule, Take 1 capsule by mouth Daily., Disp: 30 capsule, Rfl: 0    tretinoin (RETIN-A) 0.05 % cream, Apply  topically Every Night., Disp: 45 g, Rfl: 3    ALPRAZolam (XANAX) 0.25 MG tablet, Take 1 tablet by mouth 2 (Two) Times a Day As Needed for Anxiety. (Patient not taking: Reported on 1/4/2024), Disp: 15 tablet, Rfl: 0    loratadine (CLARITIN) 10 MG tablet, Take 1 tablet by mouth. (Patient not taking: Reported on 1/4/2024), Disp: , Rfl:     Objective     VITAL SIGNS:     Vitals:    01/29/24 1113   BP: 116/76   Pulse: 74   Resp: 16   Temp: 97.2 °F (36.2 °C)   TempSrc: Temporal   SpO2: 97%   Weight: 62.2 kg (137 lb 3.2 oz)   Height: 177.8 cm (70\")   PainSc: "   2   PainLoc: Foot  Comment: lt     Body mass index is 19.69 kg/m².     Wt Readings from Last 5 Encounters:   01/29/24 62.2 kg (137 lb 3.2 oz)   01/04/24 61 kg (134 lb 8 oz)   12/27/23 61.2 kg (135 lb)   10/09/23 63.5 kg (140 lb)   04/27/23 67.8 kg (149 lb 6.4 oz)     PHYSICAL EXAMINATION:   GENERAL: The patient appears in good general condition, not in acute distress.   SKIN: No ecchymosis.  EYES: No jaundice. No Pallor.  CHEST: Normal respiratory effort.   CVS: No edema.  ABDOMEN: Nondistended.  EXTREMITIES: Improvement in the left leg swelling and tenderness.  No calf tenderness.  Minimal size difference between the left and right legs.  Tenderness on exam of the area of the metatarsal phalangeal area of the third and fourth toes.  The skin is erythematous.  There is a scab on the dorsal aspect of the foot at the site of the previous bulla.    DIAGNOSTIC DATA:     Results from last 7 days   Lab Units 01/29/24  1103   WBC 10*3/mm3 9.06   NEUTROS ABS 10*3/mm3 4.01   HEMOGLOBIN g/dL 12.9   HEMATOCRIT % 40.7   PLATELETS 10*3/mm3 268     Results from last 7 days   Lab Units 01/29/24  1103   SODIUM mmol/L 140   POTASSIUM mmol/L 3.9   CHLORIDE mmol/L 104   CO2 mmol/L 27.5   BUN mg/dL 15   CREATININE mg/dL 0.89   CALCIUM mg/dL 9.3   ALBUMIN g/dL 4.0   BILIRUBIN mg/dL 1.2   ALK PHOS U/L 62   ALT (SGPT) U/L 9   AST (SGOT) U/L 20   GLUCOSE mg/dL 84     Component      Latest Ref Rng 12/13/2023 1/4/2024   Anticardiolipin IgG      0 - 19.9 GPL - U/mL <1.6 (E)    Anticardiolipin IgM      0 - 19.9 MPL - U/mL 1.6 (E)    Beta-2 Glyco 1 IgG      0 - 19.9 U/mL <1.4 (E)    Beta-2 Glyco 1 IgM      0 - 19.9 U/mL 1.6 (E)    Protime      11.7 - 14.2 Seconds 15.0 (H) (E) 14.9 (H)    INR      0.90 - 1.10  1.3 (E) 1.15 (H)    AntiThromb III Func      83 - 128 % 51 (L) (E)    Fibrinogen      219 - 464 mg/dL 195 (L) (E) 659 (H)    [id number] Activity      >66.8 % 16 (L) (E) 83.6    PTT      22.7 - 35.4 seconds 38.9 (H) (E) 31.9    Protein C  Activity      86 - 163 %  87    ANTITHROMBIN ACTIVITY      90 - 134 %  137 (H)         Assessment & Plan    *Acute DVT diagnosed on 12/13/2023.  She was found to have acute left common femoral, popliteal, gastrocnemius, posterior tibial and peroneal veins.  There was acute superficial thrombus in the left greater saphenous above the knee and in the small saphenous.  Patient was initially unable to be placed on anticoagulation due to coagulopathy.  She had IVC filter placed.  She was sequently placed on IV heparin.  As she became more stable, she was switched to Eliquis.  The plan was to remove the IVC filter before discharge but this was not done due to scheduling problem.   Exam today revealed tenderness in the calf area.   She is tolerating Eliquis 5 mg twice daily.   Exam revealed an area most likely representing a subcutaneous hematoma at the left forefoot between the second and third toes.  The triggering factors for her DVT were Evista (raloxifene).  In addition, she had COVID-19 infection in October 2023 and she developed groundglass changes.  Patient traveled multiple times between October and December 2023.  Patient was continued on Eliquis 5 mg twice daily with plans for a minimum of 6 months of anticoagulation.  There is subjective and objective evidence of improvement of the left lower extremity DVT on today's evaluation.  Patient asked if she can have right hip surgery for gluteus jean pierre tendon.  I recommended waiting for at least 6 months from the time of the surgery.    *Consumptive coagulopathy.  Patient had significantly decreased ADAMTS 13 activity of 16% on 12/13/2023.  She also had reduced protein C and Antithrombin III activity.  She was initially suspected of developing HUS.  Here coagulopathy parameters improved.  The coagulopathy changes were therefore considered reactive.  PT PTT and fibrinogen were repeated on 1/4/2024.  PTT is mildly prolonged at 14.9-likely secondary to Eliquis.  PTT  normal at 31.9.  Fibrinogen elevated at 659-reflecting underlying inflammation.  I reassured the patient that the repeat labs on 1/4/2024 showed resolution of the coagulopathy.    *Thrombocytopenia.  Platelet count decreased to a shruti of 30,000 on 12/15/2023  Platelet count gradually improved afterwards.  1/4/2024: Platelet count improved to 420,000.  1/29/2024: Platelet count improved to 268,000.    *Lymphocytosis.  12/16/2023: Lymphocyte count 3600.  12/27/2023: Lymphocyte count decreased to 2690.  1/4/2024: Lymphocyte count increased to 3540.  1/29/2024: Lymphocyte count increased to 4020.  This is attributed to inflammation.   Lymphocyte count will be repeated at her follow-up visit.    *Hypokalemia.  1/4/2024: Potassium 3.1.  Patient was started on potassium chloride 10 mEq daily.  1/29/2024: Potassium improved to 3.9.  Patient does not need additional potassium replacement.    *Left forefoot pain, redness and tenderness.  Patient started having symptoms in the area prior to development of DVT.  Exam today revealed tenderness concerning for underlying fracture or abscess.  He has a foot specialist who treated her right foot in the past.  I recommended having follow-up with the foot specialist.  I believe that an MRI will help in determining the underlying pathology.    PLAN:    1.  Continue Eliquis 5 mg twice daily.  2.  I recommended evaluation of her left foot by her foot specialist.  She likely needs an MRI done.  3.  Patient given the okay to take bisphosphonate for her low bone density.  4.  She will need to have the IVC filter placed prior to the 6-month jorge-before the end of June 2024.  5.  Obtain venous Doppler left lower extremity in June 2024.  Will see her in follow-up 1 week after with CBC CMP.    I spent 40 minutes caring for Sofia on this date of service. This time includes time spent by me in the following activities: preparing for the visit, reviewing tests, obtaining and/or reviewing a  separately obtained history, performing a medically appropriate examination and/or evaluation, counseling and educating the patient/family/caregiver, ordering medications, tests, or procedures, documenting information in the medical record, and independently interpreting results and communicating that information with the patient/family/caregiver       Seble Davalos MD  01/29/24

## 2024-01-31 RX ORDER — POTASSIUM CHLORIDE 750 MG/1
10 CAPSULE, EXTENDED RELEASE ORAL DAILY
Qty: 30 CAPSULE | Refills: 0 | OUTPATIENT
Start: 2024-01-31

## 2024-02-13 ENCOUNTER — OFFICE VISIT (OUTPATIENT)
Dept: FAMILY MEDICINE CLINIC | Facility: CLINIC | Age: 64
End: 2024-02-13
Payer: COMMERCIAL

## 2024-02-13 VITALS
WEIGHT: 144 LBS | HEART RATE: 99 BPM | DIASTOLIC BLOOD PRESSURE: 90 MMHG | BODY MASS INDEX: 20.62 KG/M2 | SYSTOLIC BLOOD PRESSURE: 144 MMHG | RESPIRATION RATE: 16 BRPM | OXYGEN SATURATION: 99 % | HEIGHT: 70 IN

## 2024-02-13 DIAGNOSIS — Z13.220 SCREENING FOR LIPOID DISORDERS: ICD-10-CM

## 2024-02-13 DIAGNOSIS — Z00.00 ROUTINE GENERAL MEDICAL EXAMINATION AT A HEALTH CARE FACILITY: Primary | ICD-10-CM

## 2024-02-13 DIAGNOSIS — Z23 NEED FOR VACCINATION: ICD-10-CM

## 2024-02-14 LAB
CHOLEST SERPL-MCNC: 218 MG/DL (ref 100–199)
HDLC SERPL-MCNC: 83 MG/DL
LDLC SERPL CALC-MCNC: 107 MG/DL (ref 0–99)
LDLC/HDLC SERPL: 1.3 RATIO (ref 0–3.2)
TRIGL SERPL-MCNC: 166 MG/DL (ref 0–149)
VLDLC SERPL CALC-MCNC: 28 MG/DL (ref 5–40)

## 2024-04-09 ENCOUNTER — TELEPHONE (OUTPATIENT)
Dept: ONCOLOGY | Facility: CLINIC | Age: 64
End: 2024-04-09
Payer: COMMERCIAL

## 2024-04-09 NOTE — TELEPHONE ENCOUNTER
Caller: Sofia Mendez    Relationship to patient: Self    Best call back number:     [phone]     Chief complaint: PT HAD TO CHANGE DOPPLER APPT AND NEEDS TO R/S APPT SO RESULTS WILL BE BACK.     Type of visit: LAB AND FOLLOW UP    Requested date: AFTERNOON OF 06/21/24    If rescheduling, when is the original appointment: 06/17/24             Patient is here for follow-up after robotic left colectomy on 5/5/2023.  3 of 33 lymph nodes were positive for cancer.  She was discharged home from the hospital on about postoperative day #3 or 4.    Patient is doing well with her recovery from surgery.  She is eating fine and having normal bowel function.  Incisions are all healing fine and abdominal exam is benign.    Pathology result was discussed with the patient.  I told her she will need to receive adjuvant therapy and the exact plan regarding that will be discussed with her by Dr. Marroquin as she already established care with Dr. Marroquin prior to surgery for the colon cancer.    The patient scheduled to see Dr. Marroquin on 5/31/23.  I will make arrangements for a port-a-catheter to be placed on 6/5/23.  Surveillance colonoscopy in about one year per GI physician.

## 2024-04-19 DIAGNOSIS — M81.0 OSTEOPOROSIS WITHOUT CURRENT PATHOLOGICAL FRACTURE, UNSPECIFIED OSTEOPOROSIS TYPE: Primary | ICD-10-CM

## 2024-06-11 RX ORDER — APIXABAN 5 MG/1
5 TABLET, FILM COATED ORAL 2 TIMES DAILY
Qty: 60 TABLET | Refills: 4 | Status: SHIPPED | OUTPATIENT
Start: 2024-06-11 | End: 2024-06-17 | Stop reason: SDUPTHER

## 2024-06-14 ENCOUNTER — HOSPITAL ENCOUNTER (OUTPATIENT)
Dept: CARDIOLOGY | Facility: HOSPITAL | Age: 64
Discharge: HOME | End: 2024-06-14
Payer: COMMERCIAL

## 2024-06-14 DIAGNOSIS — I82.402 ACUTE DEEP VEIN THROMBOSIS (DVT) OF LEFT LOWER EXTREMITY, UNSPECIFIED VEIN: ICD-10-CM

## 2024-06-14 LAB
BH CV LOW VAS LEFT COMMON FEMORAL SPONT: 1
BH CV LOWER VASCULAR LEFT COMMON FEMORAL AUGMENT: NORMAL
BH CV LOWER VASCULAR LEFT COMMON FEMORAL COMPETENT: NORMAL
BH CV LOWER VASCULAR LEFT COMMON FEMORAL COMPRESS: NORMAL
BH CV LOWER VASCULAR LEFT COMMON FEMORAL PHASIC: NORMAL
BH CV LOWER VASCULAR LEFT COMMON FEMORAL SPONT: NORMAL
BH CV LOWER VASCULAR LEFT DISTAL FEMORAL COMPRESS: NORMAL
BH CV LOWER VASCULAR LEFT GASTRONEMIUS COMPRESS: NORMAL
BH CV LOWER VASCULAR LEFT GREATER SAPH AK COMPRESS: NORMAL
BH CV LOWER VASCULAR LEFT GREATER SAPH BK COMPRESS: NORMAL
BH CV LOWER VASCULAR LEFT LESSER SAPH COMPRESS: NORMAL
BH CV LOWER VASCULAR LEFT MID FEMORAL AUGMENT: NORMAL
BH CV LOWER VASCULAR LEFT MID FEMORAL COMPETENT: NORMAL
BH CV LOWER VASCULAR LEFT MID FEMORAL COMPRESS: NORMAL
BH CV LOWER VASCULAR LEFT MID FEMORAL PHASIC: NORMAL
BH CV LOWER VASCULAR LEFT MID FEMORAL SPONT: NORMAL
BH CV LOWER VASCULAR LEFT PERONEAL COMPRESS: NORMAL
BH CV LOWER VASCULAR LEFT POPLITEAL AUGMENT: NORMAL
BH CV LOWER VASCULAR LEFT POPLITEAL COMPETENT: NORMAL
BH CV LOWER VASCULAR LEFT POPLITEAL COMPRESS: NORMAL
BH CV LOWER VASCULAR LEFT POPLITEAL PHASIC: NORMAL
BH CV LOWER VASCULAR LEFT POPLITEAL SPONT: NORMAL
BH CV LOWER VASCULAR LEFT POSTERIOR TIBIAL COMPRESS: NORMAL
BH CV LOWER VASCULAR LEFT PROFUNDA FEMORAL COMPRESS: NORMAL
BH CV LOWER VASCULAR LEFT PROXIMAL FEMORAL COMPRESS: NORMAL
BH CV LOWER VASCULAR LEFT SAPHENOFEMORAL JUNCTION COMPRESS: NORMAL
BH CV LOWER VASCULAR LEFT SOLEAL COMPRESS: NORMAL
BH CV LOWER VASCULAR RIGHT COMMON FEMORAL AUGMENT: NORMAL
BH CV LOWER VASCULAR RIGHT COMMON FEMORAL COMPETENT: NORMAL
BH CV LOWER VASCULAR RIGHT COMMON FEMORAL COMPRESS: NORMAL
BH CV LOWER VASCULAR RIGHT COMMON FEMORAL PHASIC: NORMAL
BH CV LOWER VASCULAR RIGHT COMMON FEMORAL SPONT: NORMAL

## 2024-06-14 PROCEDURE — 93971 EXTREMITY STUDY: CPT

## 2024-06-17 ENCOUNTER — LAB (OUTPATIENT)
Dept: LAB | Facility: HOSPITAL | Age: 64
End: 2024-06-17
Payer: COMMERCIAL

## 2024-06-17 ENCOUNTER — OFFICE VISIT (OUTPATIENT)
Dept: ONCOLOGY | Facility: CLINIC | Age: 64
End: 2024-06-17
Payer: COMMERCIAL

## 2024-06-17 VITALS
SYSTOLIC BLOOD PRESSURE: 133 MMHG | WEIGHT: 145.9 LBS | DIASTOLIC BLOOD PRESSURE: 81 MMHG | BODY MASS INDEX: 20.89 KG/M2 | OXYGEN SATURATION: 99 % | HEIGHT: 70 IN | TEMPERATURE: 98.2 F | RESPIRATION RATE: 16 BRPM | HEART RATE: 68 BPM

## 2024-06-17 DIAGNOSIS — D72.820 LYMPHOCYTOSIS: ICD-10-CM

## 2024-06-17 DIAGNOSIS — E80.6 HYPERBILIRUBINEMIA: ICD-10-CM

## 2024-06-17 DIAGNOSIS — E87.6 HYPOKALEMIA: ICD-10-CM

## 2024-06-17 DIAGNOSIS — D69.6 THROMBOCYTOPENIA: ICD-10-CM

## 2024-06-17 DIAGNOSIS — D65 CONSUMPTIVE COAGULOPATHY: ICD-10-CM

## 2024-06-17 DIAGNOSIS — I82.402 ACUTE DEEP VEIN THROMBOSIS (DVT) OF LEFT LOWER EXTREMITY, UNSPECIFIED VEIN: ICD-10-CM

## 2024-06-17 DIAGNOSIS — I82.402 ACUTE DEEP VEIN THROMBOSIS (DVT) OF LEFT LOWER EXTREMITY, UNSPECIFIED VEIN: Primary | ICD-10-CM

## 2024-06-17 DIAGNOSIS — L02.612 FOOT ABSCESS, LEFT: ICD-10-CM

## 2024-06-17 LAB
ALBUMIN SERPL-MCNC: 4.3 G/DL (ref 3.5–5.2)
ALBUMIN/GLOB SERPL: 1.7 G/DL
ALP SERPL-CCNC: 53 U/L (ref 39–117)
ALT SERPL W P-5'-P-CCNC: 7 U/L (ref 1–33)
ANION GAP SERPL CALCULATED.3IONS-SCNC: 10.9 MMOL/L (ref 5–15)
AST SERPL-CCNC: 23 U/L (ref 1–32)
BASOPHILS # BLD AUTO: 0.05 10*3/MM3 (ref 0–0.2)
BASOPHILS NFR BLD AUTO: 0.8 % (ref 0–1.5)
BILIRUB SERPL-MCNC: 2.1 MG/DL (ref 0–1.2)
BUN SERPL-MCNC: 15 MG/DL (ref 8–23)
BUN/CREAT SERPL: 15 (ref 7–25)
CALCIUM SPEC-SCNC: 9 MG/DL (ref 8.6–10.5)
CHLORIDE SERPL-SCNC: 104 MMOL/L (ref 98–107)
CO2 SERPL-SCNC: 26.1 MMOL/L (ref 22–29)
CREAT SERPL-MCNC: 1 MG/DL (ref 0.57–1)
DEPRECATED RDW RBC AUTO: 51.2 FL (ref 37–54)
EGFRCR SERPLBLD CKD-EPI 2021: 63.4 ML/MIN/1.73
EOSINOPHIL # BLD AUTO: 0.12 10*3/MM3 (ref 0–0.4)
EOSINOPHIL NFR BLD AUTO: 1.8 % (ref 0.3–6.2)
ERYTHROCYTE [DISTWIDTH] IN BLOOD BY AUTOMATED COUNT: 14.6 % (ref 12.3–15.4)
GLOBULIN UR ELPH-MCNC: 2.6 GM/DL
GLUCOSE SERPL-MCNC: 114 MG/DL (ref 65–99)
HCT VFR BLD AUTO: 41.6 % (ref 34–46.6)
HGB BLD-MCNC: 13.8 G/DL (ref 12–15.9)
IMM GRANULOCYTES # BLD AUTO: 0.02 10*3/MM3 (ref 0–0.05)
IMM GRANULOCYTES NFR BLD AUTO: 0.3 % (ref 0–0.5)
LYMPHOCYTES # BLD AUTO: 2.51 10*3/MM3 (ref 0.7–3.1)
LYMPHOCYTES NFR BLD AUTO: 38.4 % (ref 19.6–45.3)
MCH RBC QN AUTO: 31.7 PG (ref 26.6–33)
MCHC RBC AUTO-ENTMCNC: 33.2 G/DL (ref 31.5–35.7)
MCV RBC AUTO: 95.6 FL (ref 79–97)
MONOCYTES # BLD AUTO: 0.55 10*3/MM3 (ref 0.1–0.9)
MONOCYTES NFR BLD AUTO: 8.4 % (ref 5–12)
NEUTROPHILS NFR BLD AUTO: 3.28 10*3/MM3 (ref 1.7–7)
NEUTROPHILS NFR BLD AUTO: 50.3 % (ref 42.7–76)
NRBC BLD AUTO-RTO: 0 /100 WBC (ref 0–0.2)
PLATELET # BLD AUTO: 218 10*3/MM3 (ref 140–450)
PMV BLD AUTO: 8.9 FL (ref 6–12)
POTASSIUM SERPL-SCNC: 4 MMOL/L (ref 3.5–5.2)
PROT SERPL-MCNC: 6.9 G/DL (ref 6–8.5)
RBC # BLD AUTO: 4.35 10*6/MM3 (ref 3.77–5.28)
SODIUM SERPL-SCNC: 141 MMOL/L (ref 136–145)
WBC NRBC COR # BLD AUTO: 6.53 10*3/MM3 (ref 3.4–10.8)

## 2024-06-17 PROCEDURE — 99214 OFFICE O/P EST MOD 30 MIN: CPT | Performed by: INTERNAL MEDICINE

## 2024-06-17 PROCEDURE — 85025 COMPLETE CBC W/AUTO DIFF WBC: CPT

## 2024-06-17 PROCEDURE — 36415 COLL VENOUS BLD VENIPUNCTURE: CPT

## 2024-06-17 PROCEDURE — 80053 COMPREHEN METABOLIC PANEL: CPT

## 2024-06-17 NOTE — PROGRESS NOTES
"Subjective     CHIEF COMPLAINT:      Chief Complaint   Patient presents with    Follow-up     HISTORY OF PRESENT ILLNESS:     Sofia Mendez is a 63 y.o. female patient who returns today for follow up on her left lower extremity deep vein thrombosis. She is on Eliquis 5 mg twice daily. She is tolerating it without problem with bleeding or bruising. She reports dark coloration of the left leg and foot. In addition, the left fore-foot and toes become purplish in color when she sits for a prolonged period of time.     Patient continues to have pain due to the gluteus jean pierre Tendon tear. She is going to have surgery in the near future.     Patient is planning to travel in the coming days.    Patient is scheduled to have the IVC filter removed tomorrow.     ROS:  Pertinent ROS is in the HPI.     Past medical, surgical, social and family history were reviewed.     MEDICATIONS:    Current Outpatient Medications:     Acetaminophen (TYLENOL PO), Take 500 mg by mouth As Needed., Disp: , Rfl:     Eliquis 5 MG tablet tablet, TAKE 1 TABLET BY MOUTH TWICE A DAY, Disp: 60 tablet, Rfl: 4    tretinoin (RETIN-A) 0.05 % cream, Apply  topically Every Night., Disp: 45 g, Rfl: 3    Objective     VITAL SIGNS:     Vitals:    06/17/24 1024   BP: 133/81   Pulse: 68   Resp: 16   Temp: 98.2 °F (36.8 °C)   TempSrc: Oral   SpO2: 99%   Weight: 66.2 kg (145 lb 14.4 oz)   Height: 177.8 cm (70\")   PainSc: 0-No pain     Body mass index is 20.93 kg/m².     Wt Readings from Last 5 Encounters:   06/17/24 66.2 kg (145 lb 14.4 oz)   02/13/24 65.3 kg (144 lb)   01/29/24 62.2 kg (137 lb 3.2 oz)   01/04/24 61 kg (134 lb 8 oz)   12/27/23 61.2 kg (135 lb)     PHYSICAL EXAMINATION:   GENERAL: The patient appears in good general condition, not in acute distress.   SKIN: No ecchymosis.  EYES: No jaundice. No Pallor.  CHEST: Normal respiratory effort.   CVS: No edema.  ABDOMEN: Non-distended.  EXTREMITIES: Hyperpigmentation of the left leg distal aspect. No calf " tenderness. Dependent rubor of the left forefoot and toes.     DIAGNOSTIC DATA:     Results from last 7 days   Lab Units 06/17/24  1006   WBC 10*3/mm3 6.53   NEUTROS ABS 10*3/mm3 3.28   HEMOGLOBIN g/dL 13.8   HEMATOCRIT % 41.6   PLATELETS 10*3/mm3 218     Results from last 7 days   Lab Units 06/17/24  1006   SODIUM mmol/L 141   POTASSIUM mmol/L 4.0   CHLORIDE mmol/L 104   CO2 mmol/L 26.1   BUN mg/dL 15   CREATININE mg/dL 1.00   CALCIUM mg/dL 9.0   ALBUMIN g/dL 4.3   BILIRUBIN mg/dL 2.1*   ALK PHOS U/L 53   ALT (SGPT) U/L 7   AST (SGOT) U/L 23   GLUCOSE mg/dL 114*     Venous Doppler on 6/14/2024:    There was deep venous valvular incompetence noted in the left common femoral.    All other left sided veins appeared normal.    Assessment & Plan    *Acute DVT diagnosed on 12/13/2023.  She was found to have acute left common femoral, popliteal, gastrocnemius, posterior tibial and peroneal veins.  There was acute superficial thrombus in the left greater saphenous above the knee and in the small saphenous.  Patient was initially unable to be placed on anticoagulation due to coagulopathy.  She had IVC filter placed.  She was sequently placed on IV heparin.  As she became more stable, she was switched to Eliquis.  The plan was to remove the IVC filter before discharge but this was not done due to scheduling problem.   Evesta was suspected of contributing to the development of DVT.  In addition, she had COVID-19 infection in October 2023 and she developed groundglass changes.  Patient traveled multiple times between October and December 2023.  Exam revealed an area most likely representing a subcutaneous hematoma at the left forefoot between the second and third toes.  She was continued on Eliquis 5 mg twice daily with plans for a minimum of 6 months of anticoagulation.  Exam on 1/29/2024 revealed objective evidence of improvement of the left lower extremity DVT.  Patient asked if she can have right hip surgery for gluteus  jean pierre tendon.  Delaying the procedure for at least 6 months from the time of the DVT was recommended.  Venous Doppler on 6/14/2024 revealed resolution of the DVT.  There was venous incompetence of the left femoral vein.   She is having swelling of the left leg and has hyperpigmentation of the left distal leg.  She has dependent rubor of the left foot.  I explained the findings and I recommended using compression stocking to decrease the symptoms that are attributed to the valvular incompetence.    *Consumptive coagulopathy.  Patient had significantly decreased ADAMTS 13 activity of 16% on 12/13/2023.  She also had reduced protein C and Antithrombin III activity.  She was initially suspected of developing HUS.  Here coagulopathy parameters improved.  The coagulopathy changes were therefore considered reactive.  PT PTT and fibrinogen were repeated on 1/4/2024.  PTT is mildly prolonged at 14.9-likely secondary to Eliquis.  PTT normal at 31.9.  Fibrinogen elevated at 659-reflecting underlying inflammation.  The labs indicated resolution of the coagulopathy.    *Thrombocytopenia.  Platelet count decreased to a shruti of 30,000 on 12/15/2023  Platelet count gradually improved afterwards.  1/4/2024: Platelet count improved to 420,000.  1/29/2024: Platelet count improved to 268,000.  6/17/2024: Platelet count 218,000.    *Lymphocytosis.  12/16/2023: Lymphocyte count 3600.  12/27/2023: Lymphocyte count decreased to 2690.  1/4/2024: Lymphocyte count increased to 3540.  1/29/2024: Lymphocyte count increased to 4020.  This was attributed to inflammation.   6/17/2024: Lymphocyte count improved to 2510-normal.    *Hyperbilirubinemia.  6/17/2024: Bilirubin level increased to 2.1.  I recommended repeating CMP in 1 month.    *Left forefoot pain, redness and tenderness.  Patient started having symptoms in the area prior to development of DVT.  Exam today revealed tenderness concerning for underlying fracture or abscess.  He has a  foot specialist who treated her right foot in the past.  I recommended having follow-up with the foot specialist.  I believe that an MRI will help in determining the underlying pathology.    PLAN:    1.  Discontinue Eliquis the day after she returns from her upcoming trip.   2.  I recommended thigh-high compression stockings with pressure of 20-30 mmHg to use during the day.  I explained to her that she does not need to wear them while sleeping.   3.  I recommended prophylactic anticoagulation with Eliquis 2.5 mg twice daily to be started 24 hours after the upcoming right gluteus jean pierre tendon repair surgery.   4.  Recommended in the future taking aspirin 81 mg x 2 days when she travels (the day of and the day after traveling).        Seble Davalos MD  06/17/24

## 2024-06-18 ENCOUNTER — TELEPHONE (OUTPATIENT)
Dept: ONCOLOGY | Facility: CLINIC | Age: 64
End: 2024-06-18
Payer: COMMERCIAL

## 2024-06-18 DIAGNOSIS — E80.6 HYPERBILIRUBINEMIA: Primary | ICD-10-CM

## 2024-06-18 NOTE — TELEPHONE ENCOUNTER
----- Message from Seble Davalos sent at 6/17/2024  8:56 PM EDT -----  Please inform the patient that her labs showed increase in the bilirubin level. Please check if she has been taking Tylenol frequently and ask her to decrease if so. I also recommend reducing intake of alcohol and repeating CMP in 1 month - can be done at our office or at her PCP's office.     Thank you            Reviewed Dr. Davalos's message and instructions with the patient, she v/u.

## 2024-06-18 NOTE — TELEPHONE ENCOUNTER
Caller: Sofia Mendez    Relationship: Self    Best call back number: [phone]    What is the best time to reach you: ANYTIME    Who are you requesting to speak with (clinical staff, provider,  specific staff member): WILMAR      What was the call regarding: PT RETURNING WILMAR'S CALL.

## 2024-06-19 ENCOUNTER — TELEPHONE (OUTPATIENT)
Dept: ONCOLOGY | Facility: CLINIC | Age: 64
End: 2024-06-19
Payer: COMMERCIAL

## 2024-06-19 NOTE — TELEPHONE ENCOUNTER
Caller: Sofia Mendez    Relationship: Self    Best call back number: [phone]    What is the best time to reach you: ANYTIME    Who are you requesting to speak with (clinical staff, provider,  specific staff member): CLINICAL         What was the call regarding:     SEEN DR IRBY MONDAY AND HE HAD ORDERED FOR A THIGH HIGH COMPRESSION SOCKS     [place] THIS [place] AND [facility] HAS NOT RECEIVED THE ORDER FOR THIS YET FOLLOWING UP WILL NEED THIS BEFORE NEXT MONDAY 06/24    Is it okay if the provider responds through MyChart: YES

## 2024-06-19 NOTE — TELEPHONE ENCOUNTER
Let patient know the [facility] order form was sent this morning. Advised that she call [facility] tomorrow to check on the status. She v/u.

## 2024-07-29 ENCOUNTER — LAB (OUTPATIENT)
Dept: LAB | Facility: HOSPITAL | Age: 64
End: 2024-07-29
Payer: COMMERCIAL

## 2024-07-29 DIAGNOSIS — E80.6 HYPERBILIRUBINEMIA: ICD-10-CM

## 2024-07-29 LAB
ALBUMIN SERPL-MCNC: 4.2 G/DL (ref 3.5–5.2)
ALBUMIN/GLOB SERPL: 1.7 G/DL
ALP SERPL-CCNC: 53 U/L (ref 39–117)
ALT SERPL W P-5'-P-CCNC: 5 U/L (ref 1–33)
ANION GAP SERPL CALCULATED.3IONS-SCNC: 8.3 MMOL/L (ref 5–15)
AST SERPL-CCNC: 22 U/L (ref 1–32)
BILIRUB SERPL-MCNC: 1.6 MG/DL (ref 0–1.2)
BUN SERPL-MCNC: 17 MG/DL (ref 8–23)
BUN/CREAT SERPL: 17 (ref 7–25)
CALCIUM SPEC-SCNC: 8.8 MG/DL (ref 8.6–10.5)
CHLORIDE SERPL-SCNC: 104 MMOL/L (ref 98–107)
CO2 SERPL-SCNC: 27.7 MMOL/L (ref 22–29)
CREAT SERPL-MCNC: 1 MG/DL (ref 0.57–1)
EGFRCR SERPLBLD CKD-EPI 2021: 63 ML/MIN/1.73
GLOBULIN UR ELPH-MCNC: 2.5 GM/DL
GLUCOSE SERPL-MCNC: 89 MG/DL (ref 65–99)
POTASSIUM SERPL-SCNC: 3.7 MMOL/L (ref 3.5–5.2)
PROT SERPL-MCNC: 6.7 G/DL (ref 6–8.5)
SODIUM SERPL-SCNC: 140 MMOL/L (ref 136–145)

## 2024-07-29 PROCEDURE — 80053 COMPREHEN METABOLIC PANEL: CPT

## 2024-07-29 PROCEDURE — 36415 COLL VENOUS BLD VENIPUNCTURE: CPT

## 2024-08-01 ENCOUNTER — TELEPHONE (OUTPATIENT)
Dept: ONCOLOGY | Facility: CLINIC | Age: 64
End: 2024-08-01
Payer: COMMERCIAL

## 2024-08-01 NOTE — TELEPHONE ENCOUNTER
----- Message from Seble Davalos sent at 8/1/2024  8:03 AM EDT -----  Please inform the patient that her bilirubin improved compared to the previous labs.  No additional testing is therefore needed.    Thank you

## 2024-08-07 ENCOUNTER — OFFICE VISIT (OUTPATIENT)
Dept: FAMILY MEDICINE CLINIC | Facility: CLINIC | Age: 64
End: 2024-08-07
Payer: COMMERCIAL

## 2024-08-07 VITALS
RESPIRATION RATE: 16 BRPM | BODY MASS INDEX: 20.9 KG/M2 | DIASTOLIC BLOOD PRESSURE: 70 MMHG | HEART RATE: 68 BPM | HEIGHT: 70 IN | OXYGEN SATURATION: 99 % | SYSTOLIC BLOOD PRESSURE: 124 MMHG | WEIGHT: 146 LBS

## 2024-08-07 DIAGNOSIS — M25.551 RIGHT HIP PAIN: Primary | ICD-10-CM

## 2024-08-07 PROCEDURE — 99213 OFFICE O/P EST LOW 20 MIN: CPT | Performed by: FAMILY MEDICINE

## 2024-08-07 RX ORDER — ALENDRONATE SODIUM 70 MG/1
70 TABLET ORAL
COMMUNITY
Start: 2024-06-20

## 2024-08-07 NOTE — PROGRESS NOTES
"Subjective   Sofia Mendez is a 64 y.o. female.   Pre-op Exam    History of Present Illness  Sofia is here today for pre op clearance.  She is having a tendon reattachment to her Rt hip 8/26/24.  She has been through a lot recently and hopes that this is the last piece of surgery.  She has no complaints or concerns except for the pain in the leg that is going to be treated with the surgery.  She is otherwise feeling well and feeling like she is finally feeling better after her surgeries.    She has a history of acute deep venous thrombosis and is taking Eliquis as advised.  She states she is doing better with this and her surgeon is well aware that she is on a blood thinner.    Review of Systems   Constitutional: Negative.    HENT: Negative.     Respiratory: Negative.     Cardiovascular: Negative.    Gastrointestinal: Negative.    Musculoskeletal:         Pain in right hip which causes pain down right leg.   Skin: Negative.    Neurological: Negative.    Psychiatric/Behavioral: Negative.         Objective   Vitals:    08/07/24 1111   BP: 124/70   Pulse: 68   Resp: 16   SpO2: 99%   Weight: 66.2 kg (146 lb)   Height: 177.8 cm (70\")      Body mass index is 20.95 kg/m².  BMI is within normal parameters. No other follow-up for BMI required.    GENERAL: alert and oriented, afebrile and vital signs stable  HEENT: oral mucosa moist, PEERLA, EOM, conjunctiva normal  No cervical adenopathy  LUNGS: clear to ascultation bilaterally, no rales, ronchi or wheezing  HEART: RRR S1 S2 without murmers, thrills, rubs or gallops  CHEST WALL: within normal limits, no tenderness  ABDOMEN: WNL. Normal BS.  EXTREMITIES: No clubbing, cyanosis or edema noted.  Right leg is limping.    SKIN: warm, dry, no rashes noted  NEURO: CN II- XII grossly intact  PSYCH: Good mood and positive affect          Assessment & Plan   Problem List Items Addressed This Visit    None  Visit Diagnoses       Right hip pain    -  Primary        She is managing well at " this time and is obviously very anxious about getting this surgery done.  She is well aware of the need to reduce and/or stop her Eliquis prior to surgery and she will discuss with her surgeon.       No orders of the defined types were placed in this encounter.       No follow-ups on file.

## 2024-08-29 DIAGNOSIS — Z12.11 COLON CANCER SCREENING: Primary | ICD-10-CM

## 2024-09-19 ENCOUNTER — PREP FOR SURGERY (OUTPATIENT)
Dept: OTHER | Facility: HOSPITAL | Age: 64
End: 2024-09-19
Payer: COMMERCIAL

## 2024-09-19 DIAGNOSIS — Z12.11 SCREENING FOR COLON CANCER: Primary | ICD-10-CM

## 2024-09-23 PROBLEM — Z12.11 SCREENING FOR COLON CANCER: Status: ACTIVE | Noted: 2024-09-19

## 2024-10-29 ENCOUNTER — TELEPHONE (OUTPATIENT)
Dept: ONCOLOGY | Facility: CLINIC | Age: 64
End: 2024-10-29
Payer: COMMERCIAL

## 2024-10-29 DIAGNOSIS — I82.402 ACUTE DEEP VEIN THROMBOSIS (DVT) OF LEFT LOWER EXTREMITY, UNSPECIFIED VEIN: Primary | ICD-10-CM

## 2024-10-29 NOTE — TELEPHONE ENCOUNTER
Patient called in w/ red swollen lower extremity and recent imaging revealing clot above ivc filter. She is not currently anticoagulated. Discussed w/ Dr. Davalos who instructed her to start eliquis 5mg BID (she already has plenty at home) and have dopplers done. She will f/u w/ a visit with him after that. Dania Vasquez RN

## 2024-10-29 NOTE — TELEPHONE ENCOUNTER
Caller: Sofia Mendez    Relationship: Self    Best call back number: [phone]     What is the best time to reach you: ANYTIME    Who are you requesting to speak with (clinical staff, provider,  specific staff member): CLINICAL    What was the call regarding: PATIENT HAS A FEW ISSUES SHE WOULD LIKE TO SEE DR IRBY ABOUT TO DISCUSS FURTHER. WOULD LIKE TO BE SEEN ASAP.     PATIENT IS FREE ON 11/4 OR 11/5 AND ALSO 11/8 AROUND NOON.     PLEASE CALL TO ADVISE.

## 2024-10-31 ENCOUNTER — HOSPITAL ENCOUNTER (OUTPATIENT)
Dept: CARDIOLOGY | Facility: HOSPITAL | Age: 64
Discharge: HOME | End: 2024-10-31
Admitting: INTERNAL MEDICINE
Payer: COMMERCIAL

## 2024-10-31 DIAGNOSIS — I82.402 ACUTE DEEP VEIN THROMBOSIS (DVT) OF LEFT LOWER EXTREMITY, UNSPECIFIED VEIN: ICD-10-CM

## 2024-10-31 LAB

## 2024-10-31 PROCEDURE — 93970 EXTREMITY STUDY: CPT

## 2024-11-05 ENCOUNTER — LAB (OUTPATIENT)
Dept: LAB | Facility: HOSPITAL | Age: 64
End: 2024-11-05
Payer: COMMERCIAL

## 2024-11-05 ENCOUNTER — OFFICE VISIT (OUTPATIENT)
Dept: ONCOLOGY | Facility: CLINIC | Age: 64
End: 2024-11-05
Payer: COMMERCIAL

## 2024-11-05 VITALS
HEIGHT: 70 IN | BODY MASS INDEX: 21.46 KG/M2 | OXYGEN SATURATION: 98 % | RESPIRATION RATE: 16 BRPM | SYSTOLIC BLOOD PRESSURE: 151 MMHG | TEMPERATURE: 97.7 F | DIASTOLIC BLOOD PRESSURE: 88 MMHG | WEIGHT: 149.9 LBS | HEART RATE: 64 BPM

## 2024-11-05 DIAGNOSIS — E80.6 HYPERBILIRUBINEMIA: ICD-10-CM

## 2024-11-05 DIAGNOSIS — Z91.041 CONTRAST MEDIA ALLERGY: ICD-10-CM

## 2024-11-05 DIAGNOSIS — I82.402 ACUTE DEEP VEIN THROMBOSIS (DVT) OF LEFT LOWER EXTREMITY, UNSPECIFIED VEIN: Primary | ICD-10-CM

## 2024-11-05 DIAGNOSIS — Z95.828 PRESENCE OF IVC FILTER: ICD-10-CM

## 2024-11-05 DIAGNOSIS — I82.402 ACUTE DEEP VEIN THROMBOSIS (DVT) OF LEFT LOWER EXTREMITY, UNSPECIFIED VEIN: ICD-10-CM

## 2024-11-05 LAB
ALBUMIN SERPL-MCNC: 4.2 G/DL (ref 3.5–5.2)
ALBUMIN/GLOB SERPL: 1.6 G/DL
ALP SERPL-CCNC: 56 U/L (ref 39–117)
ALT SERPL W P-5'-P-CCNC: 8 U/L (ref 1–33)
ANION GAP SERPL CALCULATED.3IONS-SCNC: 13.2 MMOL/L (ref 5–15)
AST SERPL-CCNC: 22 U/L (ref 1–32)
BASOPHILS # BLD AUTO: 0.06 10*3/MM3 (ref 0–0.2)
BASOPHILS NFR BLD AUTO: 0.6 % (ref 0–1.5)
BILIRUB SERPL-MCNC: 1.4 MG/DL (ref 0–1.2)
BUN SERPL-MCNC: 17 MG/DL (ref 8–23)
BUN/CREAT SERPL: 17.2 (ref 7–25)
CALCIUM SPEC-SCNC: 8.3 MG/DL (ref 8.6–10.5)
CHLORIDE SERPL-SCNC: 104 MMOL/L (ref 98–107)
CO2 SERPL-SCNC: 23.8 MMOL/L (ref 22–29)
CREAT SERPL-MCNC: 0.99 MG/DL (ref 0.57–1)
DEPRECATED RDW RBC AUTO: 48.3 FL (ref 37–54)
EGFRCR SERPLBLD CKD-EPI 2021: 63.8 ML/MIN/1.73
EOSINOPHIL # BLD AUTO: 0.38 10*3/MM3 (ref 0–0.4)
EOSINOPHIL NFR BLD AUTO: 4.1 % (ref 0.3–6.2)
ERYTHROCYTE [DISTWIDTH] IN BLOOD BY AUTOMATED COUNT: 13.6 % (ref 12.3–15.4)
GLOBULIN UR ELPH-MCNC: 2.7 GM/DL
GLUCOSE SERPL-MCNC: 94 MG/DL (ref 65–99)
HCT VFR BLD AUTO: 44.9 % (ref 34–46.6)
HGB BLD-MCNC: 14.2 G/DL (ref 12–15.9)
IMM GRANULOCYTES # BLD AUTO: 0.02 10*3/MM3 (ref 0–0.05)
IMM GRANULOCYTES NFR BLD AUTO: 0.2 % (ref 0–0.5)
LYMPHOCYTES # BLD AUTO: 3.85 10*3/MM3 (ref 0.7–3.1)
LYMPHOCYTES NFR BLD AUTO: 41.6 % (ref 19.6–45.3)
MCH RBC QN AUTO: 30.4 PG (ref 26.6–33)
MCHC RBC AUTO-ENTMCNC: 31.6 G/DL (ref 31.5–35.7)
MCV RBC AUTO: 96.1 FL (ref 79–97)
MONOCYTES # BLD AUTO: 0.54 10*3/MM3 (ref 0.1–0.9)
MONOCYTES NFR BLD AUTO: 5.8 % (ref 5–12)
NEUTROPHILS NFR BLD AUTO: 4.41 10*3/MM3 (ref 1.7–7)
NEUTROPHILS NFR BLD AUTO: 47.7 % (ref 42.7–76)
NRBC BLD AUTO-RTO: 0 /100 WBC (ref 0–0.2)
PLATELET # BLD AUTO: 236 10*3/MM3 (ref 140–450)
PMV BLD AUTO: 8.8 FL (ref 6–12)
POTASSIUM SERPL-SCNC: 3.5 MMOL/L (ref 3.5–5.2)
PROT SERPL-MCNC: 6.9 G/DL (ref 6–8.5)
RBC # BLD AUTO: 4.67 10*6/MM3 (ref 3.77–5.28)
SODIUM SERPL-SCNC: 141 MMOL/L (ref 136–145)
WBC NRBC COR # BLD AUTO: 9.26 10*3/MM3 (ref 3.4–10.8)

## 2024-11-05 PROCEDURE — 36415 COLL VENOUS BLD VENIPUNCTURE: CPT

## 2024-11-05 PROCEDURE — 99214 OFFICE O/P EST MOD 30 MIN: CPT | Performed by: INTERNAL MEDICINE

## 2024-11-05 PROCEDURE — 85025 COMPLETE CBC W/AUTO DIFF WBC: CPT

## 2024-11-05 PROCEDURE — 80053 COMPREHEN METABOLIC PANEL: CPT

## 2024-11-05 NOTE — PROGRESS NOTES
"Subjective     CHIEF COMPLAINT:      Chief Complaint   Patient presents with    Follow-up     Pt wants to speak about CT scan results      HISTORY OF PRESENT ILLNESS:     Sofia Mendez is a 64 y.o. female patient who returns today for follow up on her thrombosis.  She contacted our office after she had a CT scan on 10/24/2024 which revealed thrombus at the IVC filter.  She was complaining of leg swelling.  We obtained a venous Doppler and we will schedule her for follow-up.    Patient is not having back pain.  She is wearing the compression stockings regularly.  However, she develops burning sensation in the feet especially when she tries to sleep.  She tries to have the legs elevated when she sleeps but she ends up turning in bed and loses the benefit from the leg elevation.    ROS:  Pertinent ROS is in the HPI.     Past medical, surgical, social and family history were reviewed.     MEDICATIONS:    Current Outpatient Medications:     alendronate (FOSAMAX) 70 MG tablet, Take 1 tablet by mouth Every 7 (Seven) Days., Disp: , Rfl:     apixaban (Eliquis) 2.5 MG tablet tablet, Take 1 tablet by mouth 2 (Two) Times a Day. Start 24 hours after surgery, Disp: 60 tablet, Rfl: 0    tretinoin (RETIN-A) 0.05 % cream, Apply  topically Every Night., Disp: 45 g, Rfl: 3    Acetaminophen (TYLENOL PO), Take 500 mg by mouth As Needed. (Patient not taking: Reported on 11/5/2024), Disp: , Rfl:     Objective     VITAL SIGNS:     Vitals:    11/05/24 1608   BP: 151/88   Pulse: 64   Resp: 16   Temp: 97.7 °F (36.5 °C)   TempSrc: Oral   SpO2: 98%   Weight: 68 kg (149 lb 14.4 oz)   Height: 177.8 cm (70\")   PainSc: 0-No pain     Body mass index is 21.51 kg/m².     Wt Readings from Last 5 Encounters:   11/05/24 68 kg (149 lb 14.4 oz)   08/07/24 66.2 kg (146 lb)   06/17/24 66.2 kg (145 lb 14.4 oz)   02/13/24 65.3 kg (144 lb)   01/29/24 62.2 kg (137 lb 3.2 oz)     PHYSICAL EXAMINATION:   GENERAL: The patient appears in good general condition, not in " acute distress.   SKIN: No Ecchymosis.  EYES: No jaundice. No Pallor.  CHEST: Normal respiratory effort.   CVS: No edema  EXTREMITIES: Prominent veins in the lower extremities.  Dependent rubor in the left foot.    DIAGNOSTIC DATA:     Results from last 7 days   Lab Units 11/05/24  1557   WBC 10*3/mm3 9.26   NEUTROS ABS 10*3/mm3 4.41   HEMOGLOBIN g/dL 14.2   HEMATOCRIT % 44.9   PLATELETS 10*3/mm3 236     Results from last 7 days   Lab Units 11/05/24  1557   SODIUM mmol/L 141   POTASSIUM mmol/L 3.5   CHLORIDE mmol/L 104   CO2 mmol/L 23.8   BUN mg/dL 17   CREATININE mg/dL 0.99   CALCIUM mg/dL 8.3*   ALBUMIN g/dL 4.2   BILIRUBIN mg/dL 1.4*   ALK PHOS U/L 56   ALT (SGPT) U/L 8   AST (SGOT) U/L 22   GLUCOSE mg/dL 94     Venous Doppler bilateral lower extremities on 10/31/2024:   Normal bilateral lower extremity venous duplex scan.     CT abdomen pelvis with IV contrast on 10/24/2024:  Liver: No focal liver lesion.   Biliary: The gallbladder and bile ducts are normal.   Pancreas: The pancreas is homogeneous. No main duct dilatation.   Spleen: The spleen is normal in size.   Adrenal glands: No adrenal mass.   Kidneys and ureters: No suspicious renal mass or hydronephrosis.   Urinary bladder: Wall thickness of the urinary bladder is normal.   Reproductive: Uterus and adnexa are within normal limits.   Gastrointestinal: Scattered colonic diverticulosis. No focal bowel   wall thickening. The appendix is not seen.   Lymph nodes: No lymphadenopathy.   Vessels: IVC filter present. Although limited due to contrast bolus   timing, there is concern for focal low-density thrombus within the   suprarenal inferior vena cava just above the IVC filter (series 2,   image 37 and series 4, image 80).   Peritoneum: No fluid collection, free intraperitoneal fluid, or free   intraperitoneal air.   Lower chest: Partially imaged lung bases are clear. Heart size is   normal. No pericardial or pleural effusion.   Body wall: Fat-containing  periumbilical hernia. Bilateral   fat-containing inguinal hernias.   Bones: No acute or aggressive osseous lesion.     IMPRESSION:     1. Although limited by contrast timing, there is suspicious focal   low-density thrombus in the suprarenal inferior vena cava just above   the IVC filter.     2. IVC filter present on image. It is recommended that all patients   with IVC filters in place have an active management care plan to   monitor the IVC filter. If the care plan is not in place, it is   recommended this patient be referred to an interventional provider for   evaluation and establishment of an IVC filter management care plan.     IVC filter study on 6/18/2024:  FINDINGS: On IVC gram, there was significant thrombus above the filter   and at the filter apex..     IMPRESSION:     Significant thrombus above and within the inferior vena cava filter.   The filter was left in place.   The patient's hematologist,  was contacted. The patient will   resume therapeutic Eliquis and undergo CT abdomen pelvis with contrast   and date ultrasound in 3 months after the patient's right hip surgery   is concluded.     Assessment & Plan    *Acute DVT diagnosed on 12/13/2023.  She was found to have acute left common femoral, popliteal, gastrocnemius, posterior tibial and peroneal veins.  There was acute superficial thrombus in the left greater saphenous above the knee and in the small saphenous.  Patient was initially unable to be placed on anticoagulation due to coagulopathy.  She had IVC filter placed.  She was sequently placed on IV heparin.  As she became more stable, she was switched to Eliquis.  The plan was to remove the IVC filter before discharge but this was not done due to scheduling problem.   Evesta was suspected of contributing to the development of DVT.  In addition, she had COVID-19 infection in October 2023 and she developed groundglass changes.  Patient traveled multiple times between October and December  2023.  Exam revealed an area most likely representing a subcutaneous hematoma at the left forefoot between the second and third toes.  She was continued on Eliquis 5 mg twice daily with plans for a minimum of 6 months of anticoagulation.  Exam on 1/29/2024 revealed objective evidence of improvement of the left lower extremity DVT.  Patient asked if she can have right hip surgery for gluteus jean pierre tendon.  Delaying the procedure for at least 6 months from the time of the DVT was recommended.  Venous Doppler on 6/14/2024 revealed resolution of the DVT.  When seen on 6/17/2024, our plan was to discontinue Eliquis after removal of the IVC filter.  IVC filter study on 6/18/2024 revealed significant thrombus above the filter and at the filter apex.  Removing the filter was not recommended to the patient at that point.  CT abdomen with IV contrast on 10/24/2024 revealed suspicious focal low-density thrombus in the suprarenal inferior vena cava just above the IVC filter.  Venous Doppler on 10/31/2024 revealed no lower extremity DVT.    Due to the presence of the thrombus at the IVC filter, I recommended  anticoagulation with Eliquis 5 mg twice daily.  I recommended a referral to vascular surgery at [facility] for their evaluation and follow-up on the filter.    *Lymphocytosis.  12/16/2023: Lymphocyte count 3600.  12/27/2023: Lymphocyte count decreased to 2690.  1/4/2024: Lymphocyte count increased to 3540.  1/29/2024: Lymphocyte count increased to 4020.  This was attributed to inflammation.   6/17/2024: Lymphocyte count improved to 2510.  11/5/2024: Lymphocytes increased to 3850.  This will be reevaluated on the follow-up lab.    *Hyperbilirubinemia.  6/17/2024: Bilirubin level increased to 2.1.  7/29/2024: Bilirubin improved to 1.6.  11/5/2024: Bilirubin improved to 1.4.  This was likely secondary to Tylenol effect on the liver.    *IVC contrast allergy.  Patient reports developing some skin rash after the IVC study in June  2024.  Patient developed generalized itchy skin rash after the CT scan on 10/24/2024.  I explained that she will need prophylaxis with prednisone and Benadryl before future IV contrast.    *Left forefoot pain, redness and tenderness.  Patient started having symptoms in the area prior to development of DVT.  Exam today revealed tenderness concerning for underlying fracture or abscess.  He has a foot specialist who treated her right foot in the past.  I recommended having follow-up with the foot specialist.  I believe that an MRI will help in determining the underlying pathology.    PLAN:    1.  Eliquis 5 mg twice daily.  2.  We will refer to vascular surgery for them to assume care of the IVC filter.    3.  I explained to the patient that she will likely have follow-up study in 3-6 months to reevaluate the status of the thrombus at the IVC filter.  4.  I recommended wearing compression stockings, thigh-high, to decrease the leg postphlebitic symptoms.  5.  Follow-up in 3 months with CBC CMP.      Seble Davalos MD  11/05/24

## 2024-11-12 RX ORDER — APIXABAN 5 MG/1
5 TABLET, FILM COATED ORAL 2 TIMES DAILY
Qty: 60 TABLET | Refills: 2 | OUTPATIENT
Start: 2024-11-12

## 2024-12-18 ENCOUNTER — OFFICE VISIT (OUTPATIENT)
Dept: INTERNAL MEDICINE | Facility: CLINIC | Age: 64
End: 2024-12-18
Payer: COMMERCIAL

## 2024-12-18 VITALS
RESPIRATION RATE: 20 BRPM | HEART RATE: 71 BPM | OXYGEN SATURATION: 98 % | BODY MASS INDEX: 21.23 KG/M2 | SYSTOLIC BLOOD PRESSURE: 122 MMHG | DIASTOLIC BLOOD PRESSURE: 78 MMHG | WEIGHT: 148.3 LBS | HEIGHT: 70 IN

## 2024-12-18 DIAGNOSIS — Z12.31 ENCOUNTER FOR SCREENING MAMMOGRAM FOR MALIGNANT NEOPLASM OF BREAST: ICD-10-CM

## 2024-12-18 DIAGNOSIS — M85.80 OSTEOPENIA, UNSPECIFIED LOCATION: Primary | ICD-10-CM

## 2024-12-18 PROCEDURE — 99213 OFFICE O/P EST LOW 20 MIN: CPT | Performed by: STUDENT IN AN ORGANIZED HEALTH CARE EDUCATION/TRAINING PROGRAM

## 2024-12-18 RX ORDER — FLUOROURACIL 50 MG/G
CREAM TOPICAL
COMMUNITY
Start: 2024-09-27

## 2024-12-18 RX ORDER — FEXOFENADINE HCL 60 MG/1
60 TABLET, FILM COATED ORAL AS NEEDED
COMMUNITY

## 2024-12-18 RX ORDER — PHYTONADIONE (VIT K1) 100 MCG
TABLET ORAL
COMMUNITY

## 2024-12-18 NOTE — PROGRESS NOTES
"Chief Complaint  [facility]    Subjective        Sofia Mendez presents to [facility]  History of Present Illness  This is a 64-year-old female with chronic medical conditions of osteopenia and previous VTE who presents to establish care.    Osteopenia: She is on alendronate.  Follows with Dr. Gonsalves regarding this.  Prior VTE: She had a left common femoral DVT in December 2023 initially unable to be anticoagulated due to the coagulopathy.  An IVC filter in place.  This occurred in the context of having COVID in October 2023, along with travel.  She is on 5 mg Eliquis twice a day.  Recommended she follow with vascular surgery regarding movable of IVC filter.  She follows with Dr. Davalos of hematology oncology.   She has an upcoming appointment with Dr. Perdue vascular surgery next week regarding IVC filter.  Routine health maintenance: She is due for repeat DEXA in a few days and repeat mammogram March 2025.  She is agreeable to pursue these    Objective   Vital Signs:  /78 (BP Location: Left arm, Patient Position: Sitting, Cuff Size: Adult)   Pulse 71   Resp 20   Ht 177.8 cm (70\")   Wt 67.3 kg (148 lb 4.8 oz)   SpO2 98%   BMI 21.28 kg/m²   Estimated body mass index is 21.28 kg/m² as calculated from the following:    Height as of this encounter: 177.8 cm (70\").    Weight as of this encounter: 67.3 kg (148 lb 4.8 oz).    BMI is within normal parameters. No other follow-up for BMI required.      Physical Exam  Vitals and nursing note reviewed.   Constitutional:       Appearance: Normal appearance. She is normal weight.   Cardiovascular:      Rate and Rhythm: Normal rate.   Pulmonary:      Effort: No respiratory distress.   Neurological:      Mental Status: She is alert and oriented to person, place, and time.   Psychiatric:         Mood and Affect: Mood normal.         Thought Content: Thought content normal.         Judgment: Judgment normal.        Result " Review :  The following data was reviewed by: Nayely Little MD on 12/18/2024:  Common labs          7/29/2024    11:17 7/31/2024    12:49 11/5/2024    15:57   Common Labs   Glucose 89   94    BUN 17   17    Creatinine 1.00   0.99    Sodium 140   141    Potassium 3.7   3.5    Chloride 104   104    Calcium 8.8   8.3    Albumin 4.2   4.2    Total Bilirubin 1.6   1.4    Alkaline Phosphatase 53   56    AST (SGOT) 22   22    ALT (SGPT) 5   8    WBC  6.90     9.26    Hemoglobin  13.7     14.2    Hematocrit  42.6     44.9    Platelets  224     236       Details          This result is from an external source.             Data reviewed : Consultant notes hematology oncology           Assessment and Plan   Diagnoses and all orders for this visit:    1. Osteopenia, unspecified location (Primary)  -     DEXA Bone Density Axial; Future    2. Encounter for screening mammogram for malignant neoplasm of breast  -     Cancel: Mammo Screening Digital Tomosynthesis Bilateral With CAD; Future  -     Mammo Screening Digital Tomosynthesis Bilateral With CAD; Future             Follow Up   Return in about 3 months (around 3/18/2025) for Annual physical.  Patient was given instructions and counseling regarding her condition or for health maintenance advice. Please see specific information pulled into the AVS if appropriate.

## 2024-12-26 ENCOUNTER — OFFICE VISIT (OUTPATIENT)
Age: 64
End: 2024-12-26
Payer: COMMERCIAL

## 2024-12-26 VITALS
SYSTOLIC BLOOD PRESSURE: 134 MMHG | HEIGHT: 70 IN | DIASTOLIC BLOOD PRESSURE: 81 MMHG | HEART RATE: 77 BPM | WEIGHT: 148.2 LBS | BODY MASS INDEX: 21.22 KG/M2

## 2024-12-26 DIAGNOSIS — I82.4Z2 ACUTE DEEP VEIN THROMBOSIS (DVT) OF DISTAL VEIN OF LEFT LOWER EXTREMITY: Primary | ICD-10-CM

## 2024-12-26 DIAGNOSIS — D69.6 THROMBOCYTOPENIA: ICD-10-CM

## 2024-12-26 DIAGNOSIS — D65 CONSUMPTIVE COAGULOPATHY: ICD-10-CM

## 2024-12-26 NOTE — PROGRESS NOTES
Patient Name: Sofia Mendez    MRN: [id number] Encounter Date: 2024      Consulting Service: Vascular Surgery    Referring Provider: Seble Davalos MD       CHIEF COMPLAINT:  Chief Complaint   Patient presents with    IVC FILTER THROMBUS        Subjective    HPI: Sofia Mendez is a 64 y.o. female is being seen for evaluation/management of complaints of lower extremity edema of LEFT lower extremity.   Symptoms include Perdue symptoms: Edema/Swelling.  Patient describes the discomfort from the veins as not affecting their daily life.   Patient has negative family history of the varicose veins and negative family history of DVT.  At this point time attempts at symptomatic control using elevation, compression and nonsteroidals have been been attempted.  Prior prior venous interventions  include  FILTER PLACEMENT .    PAST MEDICAL HISTORY:   Past Medical History:   Diagnosis Date    Allergic     Arthritis     COVID     Depression     History of DVT (deep vein thrombosis)     lower extremity    History of melanoma     Seasonal allergies       PAST SURGICAL HISTORY:   Past Surgical History:   Procedure Laterality Date    APPENDECTOMY      COLONOSCOPY      Normal    COLONOSCOPY      FOOT SURGERY Right     OTHER SURGICAL HISTORY      Eyebrow lift    WISDOM TOOTH EXTRACTION        FAMILY HISTORY:   Family History   Problem Relation Age of Onset    No Known Problems Mother     Heart disease Father     Heart attack Father     No Known Problems Brother       SOCIAL HISTORY:   Social History     Tobacco Use    Smoking status: Former     Current packs/day: 0.00     Types: Cigarettes     Quit date: 2023     Years since quittin.4    Smokeless tobacco: Never   Vaping Use    Vaping status: Never Used   Substance Use Topics    Alcohol use: Yes     Comment: 2-3/day    Drug use: Yes     Types: Marijuana      MEDICATIONS:   Current Outpatient Medications on File Prior to Visit   Medication Sig Dispense Refill     "alendronate (FOSAMAX) 70 MG tablet Take 1 tablet by mouth Every 7 (Seven) Days.      apixaban (Eliquis) 5 MG tablet tablet Take 1 tablet by mouth 2 (Two) Times a Day. 60 tablet 2    fexofenadine (ALLEGRA) 60 MG tablet Take 1 tablet by mouth As Needed.      fluorouracil (EFUDEX) 5 % cream       tretinoin (RETIN-A) 0.05 % cream Apply  topically Every Night. 45 g 3    Vitamin K, Phytonadione, 100 MCG tablet Take  by mouth.       No current facility-administered medications on file prior to visit.       ALLERGIES: Iodinated contrast media       Objective   Vitals:    24 1153   BP: 134/81   Pulse: 77   Weight: 67.2 kg (148 lb 3.2 oz)   Height: 177.8 cm (70\")     Body mass index is 21.26 kg/m².  BMI is within normal parameters. No other follow-up for BMI required.      PHYSICAL EXAM:   Physical Exam  Constitutional:       Appearance: Normal appearance.   HENT:      Head: Normocephalic and atraumatic.      Nose: Nose normal.   Eyes:      Extraocular Movements: Extraocular movements intact.      Pupils: Pupils are equal, round, and reactive to light.   Cardiovascular:      Rate and Rhythm: Normal rate.      Pulses: Normal pulses.      Heart sounds: Normal heart sounds.      Comments: Some venous congestion at the foot and ankle on the left  Pulmonary:      Effort: Pulmonary effort is normal.      Breath sounds: Normal breath sounds.   Abdominal:      General: Abdomen is flat. Bowel sounds are normal.      Palpations: Abdomen is soft.   Musculoskeletal:         General: Normal range of motion.      Cervical back: Normal range of motion and neck supple.      Right lower leg: No edema.      Left lower le+ Edema present.   Skin:     General: Skin is warm and dry.   Neurological:      General: No focal deficit present.      Mental Status: She is alert and oriented to person, place, and time. Mental status is at baseline.   Psychiatric:         Mood and Affect: Mood normal.         Thought Content: Thought content " normal.          Result Review   LABS:    CBC          6/18/2024    10:04 7/31/2024    12:49 11/5/2024    15:57   CBC   WBC 6.49     6.90     9.26    RBC 4.55     4.43     4.67    Hemoglobin 14.0     13.7     14.2    Hematocrit 43.5     42.6     44.9    MCV 95.6     96.2     96.1    MCH 30.8     30.9     30.4    MCHC 32.2     32.2     31.6    RDW 14.6     13.1     13.6    Platelets 226     224     236       Details          This result is from an external source.             BMP          6/17/2024    10:06 7/29/2024    11:17 11/5/2024    15:57   BMP   BUN 15  17  17    Creatinine 1.00  1.00  0.99    Sodium 141  140  141    Potassium 4.0  3.7  3.5    Chloride 104  104  104    CO2 26.1  27.7  23.8    Calcium 9.0  8.8  8.3      Lipid Panel          2/13/2024    14:37   Lipid Panel   Total Cholesterol 218    Triglycerides 166    HDL Cholesterol 83    VLDL Cholesterol 28    LDL Cholesterol  107    LDL/HDL Ratio 1.3       INR          1/4/2024    09:52 6/18/2024    10:04   Common Labs   INR 1.15  0.9          Details          This result is from an external source.                   Results Review:       I reviewed the patient's new clinical results.    The following radiologic or non-invasive studies have been reviewed by me: Reviewed reports from the attempted filter removal and recent CT from [facility].  We will attempt to have these images PowerShare.  Report on venous scan as below.  Duplex Venous Lower Extremity - Bilateral CAR 10/31/2024    Interpretation Summary    Normal bilateral lower extremity venous duplex scan.     No radiology results for the last 30 days.                ASSESSMENT/PLAN:   Diagnoses and all orders for this visit:    1. Acute deep vein thrombosis (DVT) of distal vein of left lower extremity (Primary)    2. Consumptive coagulopathy    3. Thrombocytopenia       64 y.o. female with complex history of an event that occurred last December landed her in the emergency room at [facility] where she  apparently had several things going on possibly consumptive coagulopathy possibly sepsis but she also had DVT and decision was made to place a filter in addition to keeping her on anticoagulation at that time.  Really have not been able to get a good idea what the total reasoning for the filter was from the reviews I been able to do so far but I will have to look at the results of the reports further.  I have read her recent studies that show her to have some retained clot within the apex of her filter on attempted removal in June and still persistent by CT scan in October but I would like to see these images to determine if we could still get this filter out.  Will need to discuss the case with  as well.  If lifelong anticoagulation is determined to be her best option continued filter is something that could be considered.  If we are try to get the filter out in order to get her off anticoagulants and obviously as bigger reason to be more aggressive with the filter.  Will have a better answer for all these questions and we have a telehealth in 2 to 3 weeks and we will see where things go at that time.    I discussed the plan with the patient who is agreeable to the plan of care at this point. Thank you for this consult.   Follow Up  Return in about 1 month (around 1/26/2025).    Eligio Perdue MD   12/26/24

## 2025-01-09 ENCOUNTER — OFFICE VISIT (OUTPATIENT)
Age: 65
End: 2025-01-09
Payer: COMMERCIAL

## 2025-01-09 VITALS
HEIGHT: 70 IN | BODY MASS INDEX: 21.19 KG/M2 | HEART RATE: 80 BPM | DIASTOLIC BLOOD PRESSURE: 75 MMHG | SYSTOLIC BLOOD PRESSURE: 124 MMHG | WEIGHT: 148 LBS

## 2025-01-09 DIAGNOSIS — D69.6 THROMBOCYTOPENIA: ICD-10-CM

## 2025-01-09 DIAGNOSIS — Z95.828 PRESENCE OF INFERIOR VENA CAVA FILTER: ICD-10-CM

## 2025-01-09 DIAGNOSIS — D65 CONSUMPTIVE COAGULOPATHY: Primary | ICD-10-CM

## 2025-01-09 NOTE — PROGRESS NOTES
Patient Name: Sofia Mendez    MRN: [id number] Encounter Date: 2025      Consulting Service: Vascular Surgery    Referring Provider: No ref. provider found       CHIEF COMPLAINT:  Chief Complaint   Patient presents with    Acute deep vein thrombosis (DVT) of distal vein of left low       Subjective    HPI: Sofia Mendez is a 64 y.o. female is being seen for evaluation/management of  retained IVC filter with thrombus at its apex.  2 attempts at [facility] to remove it were aborted due to this thrombus.  At this point in time knowing that is present allows us to preplan and use sheath that we will capture the thrombus as well as the filter.  This is a 26 [place] sheath from the [employer] and the patient is aware that we will have to place her under anesthetic to access her neck with this sheath.  She is aware and agreeable with the plan.    PAST MEDICAL HISTORY:   Past Medical History:   Diagnosis Date    Allergic     Arthritis     COVID     Depression     History of DVT (deep vein thrombosis)     lower extremity    History of melanoma     Seasonal allergies       PAST SURGICAL HISTORY:   Past Surgical History:   Procedure Laterality Date    APPENDECTOMY      COLONOSCOPY  2014    Normal    COLONOSCOPY      FOOT SURGERY Right     OTHER SURGICAL HISTORY      Eyebrow lift    WISDOM TOOTH EXTRACTION        FAMILY HISTORY:   Family History   Problem Relation Age of Onset    No Known Problems Mother     Heart disease Father     Heart attack Father     No Known Problems Brother       SOCIAL HISTORY:   Social History     Tobacco Use    Smoking status: Former     Current packs/day: 0.00     Types: Cigarettes     Quit date: 2023     Years since quittin.4    Smokeless tobacco: Never   Vaping Use    Vaping status: Never Used   Substance Use Topics    Alcohol use: Yes     Comment: 2-3/day    Drug use: Yes     Types: Marijuana      MEDICATIONS:   Current Outpatient Medications on File Prior to Visit   Medication Sig Dispense  "Refill    alendronate (FOSAMAX) 70 MG tablet Take 1 tablet by mouth Every 7 (Seven) Days.      apixaban (Eliquis) 5 MG tablet tablet Take 1 tablet by mouth 2 (Two) Times a Day. 60 tablet 2    fexofenadine (ALLEGRA) 60 MG tablet Take 1 tablet by mouth As Needed.      fluorouracil (EFUDEX) 5 % cream       tretinoin (RETIN-A) 0.05 % cream Apply  topically Every Night. 45 g 3    Vitamin K, Phytonadione, 100 MCG tablet Take  by mouth.       No current facility-administered medications on file prior to visit.       ALLERGIES: Iodinated contrast media       Objective   Vitals:    25 1530   BP: 124/75   Pulse: 80   Weight: 67.1 kg (148 lb)   Height: 177.8 cm (70\")     Body mass index is 21.24 kg/m².  BMI is within normal parameters. No other follow-up for BMI required.      PHYSICAL EXAM:   Physical Exam  Constitutional:       Appearance: Normal appearance.   HENT:      Head: Normocephalic and atraumatic.      Nose: Nose normal.   Eyes:      Extraocular Movements: Extraocular movements intact.      Pupils: Pupils are equal, round, and reactive to light.   Cardiovascular:      Rate and Rhythm: Normal rate.      Pulses: Normal pulses.      Heart sounds: Normal heart sounds.   Pulmonary:      Effort: Pulmonary effort is normal.      Breath sounds: Normal breath sounds.   Abdominal:      General: Abdomen is flat. Bowel sounds are normal.      Palpations: Abdomen is soft.   Musculoskeletal:         General: Normal range of motion.      Cervical back: Normal range of motion and neck supple.      Right lower le+ Edema present.      Left lower le+ Edema present.   Skin:     General: Skin is warm and dry.   Neurological:      General: No focal deficit present.      Mental Status: She is alert and oriented to person, place, and time. Mental status is at baseline.   Psychiatric:         Mood and Affect: Mood normal.         Thought Content: Thought content normal.          Result Review   LABS:    CBC          2024 "    10:04 7/31/2024    12:49 11/5/2024    15:57   CBC   WBC 6.49     6.90     9.26    RBC 4.55     4.43     4.67    Hemoglobin 14.0     13.7     14.2    Hematocrit 43.5     42.6     44.9    MCV 95.6     96.2     96.1    MCH 30.8     30.9     30.4    MCHC 32.2     32.2     31.6    RDW 14.6     13.1     13.6    Platelets 226     224     236       Details          This result is from an external source.             BMP          6/17/2024    10:06 7/29/2024    11:17 11/5/2024    15:57   BMP   BUN 15  17  17    Creatinine 1.00  1.00  0.99    Sodium 141  140  141    Potassium 4.0  3.7  3.5    Chloride 104  104  104    CO2 26.1  27.7  23.8    Calcium 9.0  8.8  8.3      Lipid Panel          2/13/2024    14:37   Lipid Panel   Total Cholesterol 218    Triglycerides 166    HDL Cholesterol 83    VLDL Cholesterol 28    LDL Cholesterol  107    LDL/HDL Ratio 1.3       INR          6/18/2024    10:04   Common Labs   INR 0.9          Details          This result is from an external source.                   Results Review:       I reviewed the patient's new clinical results.    The following radiologic or non-invasive studies have been reviewed by me: I have reviewed the CT scan from October 24, 2024 which does show retained thrombus at the apex of the filter.  The filter apex is in the lumen and appears amenable to capture.  Duplex Venous Lower Extremity - Bilateral CAR 10/31/2024    Interpretation Summary    Normal bilateral lower extremity venous duplex scan.     No radiology results for the last 30 days.                ASSESSMENT/PLAN:   Diagnoses and all orders for this visit:    1. Consumptive coagulopathy (Primary)    2. Thrombocytopenia    3. Presence of inferior vena cava filter  -     Case Request; Standing  -     ceFAZolin (ANCEF) 2,000 mg in sodium chloride 0.9 % 100 mL IVPB  -     Case Request    Other orders  -     Follow Anesthesia Guidelines / Protocol; Future  -     Follow Anesthesia Guidelines / Protocol;  Standing  -     Verify NPO Status; Standing  -     Clip Operative Site; Standing  -     Verify / Perform Chlorhexidine Skin Prep; Standing  -     Provide Patient With Instructions on NPO Status; Future  -     Provide Chlorhexidine Skin Prep Wipes and Instructions; Future  -     Place Sequential Compression Device; Standing  -     Maintain Sequential Compression Device; Standing       64 y.o. female with retained IVC filter with of clot at the apex of the filter.  She is still on Eliquis.  Our plan will be to remove the filter through a venous capture sheath from an artery through the neck.  This will allow us to remove the clot in addition to remove the filter.  She is aware of the risk benefits complications and we will proceed at her convenience.  We will hold her Eliquis for 48 hours and give her heparin intraoperatively we will also plan to do this under general anesthetic as an outpatient.    I discussed the plan with the patient who is agreeable to the plan of care at this point. Thank you for this consult.   Follow Up  Return in about 1 month (around 2/9/2025).    Eligio Perdue MD   01/09/25

## 2025-01-09 NOTE — H&P (VIEW-ONLY)
Patient Name: Sofia Mendez    MRN: [id number] Encounter Date: 2025      Consulting Service: Vascular Surgery    Referring Provider: No ref. provider found       CHIEF COMPLAINT:  Chief Complaint   Patient presents with    Acute deep vein thrombosis (DVT) of distal vein of left low       Subjective    HPI: Sofia Mendez is a 64 y.o. female is being seen for evaluation/management of  retained IVC filter with thrombus at its apex.  2 attempts at [facility] to remove it were aborted due to this thrombus.  At this point in time knowing that is present allows us to preplan and use sheath that we will capture the thrombus as well as the filter.  This is a 26 [place] sheath from the [employer] and the patient is aware that we will have to place her under anesthetic to access her neck with this sheath.  She is aware and agreeable with the plan.    PAST MEDICAL HISTORY:   Past Medical History:   Diagnosis Date    Allergic     Arthritis     COVID     Depression     History of DVT (deep vein thrombosis)     lower extremity    History of melanoma     Seasonal allergies       PAST SURGICAL HISTORY:   Past Surgical History:   Procedure Laterality Date    APPENDECTOMY      COLONOSCOPY  2014    Normal    COLONOSCOPY      FOOT SURGERY Right     OTHER SURGICAL HISTORY      Eyebrow lift    WISDOM TOOTH EXTRACTION        FAMILY HISTORY:   Family History   Problem Relation Age of Onset    No Known Problems Mother     Heart disease Father     Heart attack Father     No Known Problems Brother       SOCIAL HISTORY:   Social History     Tobacco Use    Smoking status: Former     Current packs/day: 0.00     Types: Cigarettes     Quit date: 2023     Years since quittin.4    Smokeless tobacco: Never   Vaping Use    Vaping status: Never Used   Substance Use Topics    Alcohol use: Yes     Comment: 2-3/day    Drug use: Yes     Types: Marijuana      MEDICATIONS:   Current Outpatient Medications on File Prior to Visit   Medication Sig Dispense  "Refill    alendronate (FOSAMAX) 70 MG tablet Take 1 tablet by mouth Every 7 (Seven) Days.      apixaban (Eliquis) 5 MG tablet tablet Take 1 tablet by mouth 2 (Two) Times a Day. 60 tablet 2    fexofenadine (ALLEGRA) 60 MG tablet Take 1 tablet by mouth As Needed.      fluorouracil (EFUDEX) 5 % cream       tretinoin (RETIN-A) 0.05 % cream Apply  topically Every Night. 45 g 3    Vitamin K, Phytonadione, 100 MCG tablet Take  by mouth.       No current facility-administered medications on file prior to visit.       ALLERGIES: Iodinated contrast media       Objective   Vitals:    25 1530   BP: 124/75   Pulse: 80   Weight: 67.1 kg (148 lb)   Height: 177.8 cm (70\")     Body mass index is 21.24 kg/m².  BMI is within normal parameters. No other follow-up for BMI required.      PHYSICAL EXAM:   Physical Exam  Constitutional:       Appearance: Normal appearance.   HENT:      Head: Normocephalic and atraumatic.      Nose: Nose normal.   Eyes:      Extraocular Movements: Extraocular movements intact.      Pupils: Pupils are equal, round, and reactive to light.   Cardiovascular:      Rate and Rhythm: Normal rate.      Pulses: Normal pulses.      Heart sounds: Normal heart sounds.   Pulmonary:      Effort: Pulmonary effort is normal.      Breath sounds: Normal breath sounds.   Abdominal:      General: Abdomen is flat. Bowel sounds are normal.      Palpations: Abdomen is soft.   Musculoskeletal:         General: Normal range of motion.      Cervical back: Normal range of motion and neck supple.      Right lower le+ Edema present.      Left lower le+ Edema present.   Skin:     General: Skin is warm and dry.   Neurological:      General: No focal deficit present.      Mental Status: She is alert and oriented to person, place, and time. Mental status is at baseline.   Psychiatric:         Mood and Affect: Mood normal.         Thought Content: Thought content normal.          Result Review   LABS:    CBC          2024 "    10:04 7/31/2024    12:49 11/5/2024    15:57   CBC   WBC 6.49     6.90     9.26    RBC 4.55     4.43     4.67    Hemoglobin 14.0     13.7     14.2    Hematocrit 43.5     42.6     44.9    MCV 95.6     96.2     96.1    MCH 30.8     30.9     30.4    MCHC 32.2     32.2     31.6    RDW 14.6     13.1     13.6    Platelets 226     224     236       Details          This result is from an external source.             BMP          6/17/2024    10:06 7/29/2024    11:17 11/5/2024    15:57   BMP   BUN 15  17  17    Creatinine 1.00  1.00  0.99    Sodium 141  140  141    Potassium 4.0  3.7  3.5    Chloride 104  104  104    CO2 26.1  27.7  23.8    Calcium 9.0  8.8  8.3      Lipid Panel          2/13/2024    14:37   Lipid Panel   Total Cholesterol 218    Triglycerides 166    HDL Cholesterol 83    VLDL Cholesterol 28    LDL Cholesterol  107    LDL/HDL Ratio 1.3       INR          6/18/2024    10:04   Common Labs   INR 0.9          Details          This result is from an external source.                   Results Review:       I reviewed the patient's new clinical results.    The following radiologic or non-invasive studies have been reviewed by me: I have reviewed the CT scan from October 24, 2024 which does show retained thrombus at the apex of the filter.  The filter apex is in the lumen and appears amenable to capture.  Duplex Venous Lower Extremity - Bilateral CAR 10/31/2024    Interpretation Summary    Normal bilateral lower extremity venous duplex scan.     No radiology results for the last 30 days.                ASSESSMENT/PLAN:   Diagnoses and all orders for this visit:    1. Consumptive coagulopathy (Primary)    2. Thrombocytopenia    3. Presence of inferior vena cava filter  -     Case Request; Standing  -     ceFAZolin (ANCEF) 2,000 mg in sodium chloride 0.9 % 100 mL IVPB  -     Case Request    Other orders  -     Follow Anesthesia Guidelines / Protocol; Future  -     Follow Anesthesia Guidelines / Protocol;  Standing  -     Verify NPO Status; Standing  -     Clip Operative Site; Standing  -     Verify / Perform Chlorhexidine Skin Prep; Standing  -     Provide Patient With Instructions on NPO Status; Future  -     Provide Chlorhexidine Skin Prep Wipes and Instructions; Future  -     Place Sequential Compression Device; Standing  -     Maintain Sequential Compression Device; Standing       64 y.o. female with retained IVC filter with of clot at the apex of the filter.  She is still on Eliquis.  Our plan will be to remove the filter through a venous capture sheath from an artery through the neck.  This will allow us to remove the clot in addition to remove the filter.  She is aware of the risk benefits complications and we will proceed at her convenience.  We will hold her Eliquis for 48 hours and give her heparin intraoperatively we will also plan to do this under general anesthetic as an outpatient.    I discussed the plan with the patient who is agreeable to the plan of care at this point. Thank you for this consult.   Follow Up  Return in about 1 month (around 2/9/2025).    Eligio Perdue MD   01/09/25

## 2025-01-10 ENCOUNTER — TELEPHONE (OUTPATIENT)
Dept: ONCOLOGY | Facility: CLINIC | Age: 65
End: 2025-01-10
Payer: COMMERCIAL

## 2025-01-10 NOTE — TELEPHONE ENCOUNTER
Caller: Sofia Mendez    Relationship to patient: Self    Best call back number: [phone]    Chief complaint: PATIENT CALLED TO RESCHEDULE DUE TO HAVING SURGERY SAME DAY    Type of visit: LAB AND FOLLOW UP 1      If rescheduling, when is the original appointment: 2-4-25     Additional notes:PATIENT WASN'T SURE IF SHE NEEDED TO BE SEEN, IF SHE DOES WOULD NEED TO BE RESCHEDULED

## 2025-01-27 ENCOUNTER — TELEPHONE (OUTPATIENT)
Dept: SURGERY | Facility: CLINIC | Age: 65
End: 2025-01-27
Payer: COMMERCIAL

## 2025-01-27 ENCOUNTER — PRE-ADMISSION TESTING (OUTPATIENT)
Dept: PREADMISSION TESTING | Facility: HOSPITAL | Age: 65
End: 2025-01-27
Payer: COMMERCIAL

## 2025-01-27 VITALS
HEART RATE: 71 BPM | SYSTOLIC BLOOD PRESSURE: 144 MMHG | HEIGHT: 70 IN | BODY MASS INDEX: 21.32 KG/M2 | RESPIRATION RATE: 16 BRPM | WEIGHT: 148.9 LBS | OXYGEN SATURATION: 100 % | DIASTOLIC BLOOD PRESSURE: 83 MMHG | TEMPERATURE: 98.2 F

## 2025-01-27 LAB
ANION GAP SERPL CALCULATED.3IONS-SCNC: 8 MMOL/L (ref 5–15)
BUN SERPL-MCNC: 17 MG/DL (ref 8–23)
BUN/CREAT SERPL: 15.2 (ref 7–25)
CALCIUM SPEC-SCNC: 8.7 MG/DL (ref 8.6–10.5)
CHLORIDE SERPL-SCNC: 104 MMOL/L (ref 98–107)
CO2 SERPL-SCNC: 27 MMOL/L (ref 22–29)
CREAT SERPL-MCNC: 1.12 MG/DL (ref 0.57–1)
DEPRECATED RDW RBC AUTO: 42.3 FL (ref 37–54)
EGFRCR SERPLBLD CKD-EPI 2021: 55 ML/MIN/1.73
ERYTHROCYTE [DISTWIDTH] IN BLOOD BY AUTOMATED COUNT: 12.9 % (ref 12.3–15.4)
GLUCOSE SERPL-MCNC: 87 MG/DL (ref 65–99)
HCT VFR BLD AUTO: 41.1 % (ref 34–46.6)
HGB BLD-MCNC: 13.9 G/DL (ref 12–15.9)
MCH RBC QN AUTO: 31.2 PG (ref 26.6–33)
MCHC RBC AUTO-ENTMCNC: 33.8 G/DL (ref 31.5–35.7)
MCV RBC AUTO: 92.4 FL (ref 79–97)
PLATELET # BLD AUTO: 239 10*3/MM3 (ref 140–450)
PMV BLD AUTO: 9.1 FL (ref 6–12)
POTASSIUM SERPL-SCNC: 3.9 MMOL/L (ref 3.5–5.2)
QT INTERVAL: 415 MS
QTC INTERVAL: 435 MS
RBC # BLD AUTO: 4.45 10*6/MM3 (ref 3.77–5.28)
SODIUM SERPL-SCNC: 139 MMOL/L (ref 136–145)
WBC NRBC COR # BLD AUTO: 7.24 10*3/MM3 (ref 3.4–10.8)

## 2025-01-27 PROCEDURE — 36415 COLL VENOUS BLD VENIPUNCTURE: CPT

## 2025-01-27 PROCEDURE — 93010 ELECTROCARDIOGRAM REPORT: CPT | Performed by: INTERNAL MEDICINE

## 2025-01-27 PROCEDURE — 93005 ELECTROCARDIOGRAM TRACING: CPT

## 2025-01-27 PROCEDURE — 80048 BASIC METABOLIC PNL TOTAL CA: CPT

## 2025-01-27 PROCEDURE — 85027 COMPLETE CBC AUTOMATED: CPT

## 2025-01-27 NOTE — DISCHARGE INSTRUCTIONS
Take the following medications the morning of surgery:  NONE    (STOP ELIQUIS 48 HR PRIOR TO SURGERY PER MD INSTRUCTIONS)      If you are on prescription narcotic pain medication to control your pain you may also take that medication the morning of surgery.      General Instructions:     Do not eat solid food after midnight the night before surgery.  Clear liquids day of surgery are allowed but must be stopped at least two hours before your hospital arrival time.       Allowed clear liquids      Water, sodas, and tea or coffee with no cream or milk added.       12 to 20 ounces of a clear liquid that contains carbohydrates is recommended.  If non-diabetic, have Gatorade or Powerade.  If diabetic, have G2 or Powerade Zero.     Do not have liquids red in color.  Do not consume chicken, beef, pork or vegetable broth or bouillon cubes of any variety as they are not considered clear liquids and are not allowed.      Infants may have breast milk up to four hours before surgery.  Infants drinking formula may drink formula up to six hours before surgery.   Patients who avoid smoking, chewing tobacco and alcohol for 4 weeks prior to surgery have a reduced risk of post-operative complications.  Quit smoking as many days before surgery as you can.  Do not smoke, use chewing tobacco or drink alcohol the day of surgery.   If applicable bring your C-PAP/ BI-PAP machine in with you to preop day of surgery.  Bring any papers given to you in the doctor’s office.  Wear clean comfortable clothes.  Do not wear contact lenses, false eyelashes or make-up.  Bring a case for your glasses.   Bring crutches or walker if applicable.  Remove all piercings.  Leave jewelry and any other valuables at home.  Hair extensions with metal clips must be removed prior to surgery.  The Pre-Admission Testing nurse will instruct you to bring medications if unable to obtain an accurate list in Pre-Admission Testing.            Preventing a Surgical Site  Infection:  For 2 to 3 days before surgery, avoid shaving with a razor because the razor can irritate skin and make it easier to develop an infection.    Any areas of open skin can increase the risk of a post-operative wound infection by allowing bacteria to enter and travel throughout the body.  Notify your surgeon if you have any skin wounds / rashes even if it is not near the expected surgical site.  The area will need assessed to determine if surgery should be delayed until it is healed.  The night prior to surgery shower using a fresh bar of anti-bacterial soap (such as Dial) and clean washcloth.  Sleep in a clean bed with clean clothing.  Do not allow pets to sleep with you.  Shower on the morning of surgery using a fresh bar of anti-bacterial soap (such as Dial) and clean washcloth.  Dry with a clean towel and dress in clean clothing.  Ask your surgeon if you will be receiving antibiotics prior to surgery.  Make sure you, your family, and all healthcare providers clean their hands with soap and water or an alcohol based hand  before caring for you or your wound.    Day of surgery:  Your arrival time is approximately two hours before your scheduled surgery time.  Please note if you have an early arrival time the surgery doors do not open before 5:00 AM.  Upon arrival, a Pre-op nurse and Anesthesiologist will review your health history, obtain vital signs, and answer questions you may have.  The only belongings needed at this time will be a list of your home medications and if applicable your C-PAP/BI-PAP machine.  A Pre-op nurse will start an IV and you may receive medication in preparation for surgery, including something to help you relax.     Please be aware that surgery does come with discomfort.  We want to make every effort to control your discomfort so please discuss any uncontrolled symptoms with your nurse.   Your doctor will most likely have prescribed pain medications.      If you are going  home after surgery you will receive individualized written care instructions before being discharged.  A responsible adult must drive you to and from the hospital on the day of your surgery and ideally stay with you through the night.   .  Discharge prescriptions can be filled by the hospital pharmacy during regular pharmacy hours.  If you are having surgery late in the day/evening your prescription may be e-prescribed to your pharmacy.  Please verify your pharmacy hours or chose a 24 hour pharmacy to avoid not having access to your prescription because your pharmacy has closed for the day.    If you are staying overnight following surgery, you will be transported to your hospital room following the recovery period.  [facility] has all private rooms.    If you have any questions please call Pre-Admission Testing at [phone].  Deductibles and co-payments are collected on the day of service. Please be prepared to pay the required co-pay, deductible or deposit on the day of service as defined by your plan.    Call your surgeon immediately if you experience any of the following symptoms:  Sore Throat  Shortness of Breath or difficulty breathing  Cough  Chills  Body soreness or muscle pain  Headache  Fever  New loss of taste or smell  Do not arrive for your surgery ill.  Your procedure will need to be rescheduled to another time.  You will need to call your physician before the day of surgery to avoid any unnecessary exposure to hospital staff as well as other patients.              CHLORHEXIDINE CLOTH INSTRUCTIONS  The morning of surgery follow these instructions using the Chlorhexidine cloths you've been given.  These steps reduce bacteria on the body.  Do not use the cloths near your eyes, ears mouth, genitalia or on open wounds.  Throw the cloths away after use but do not try to flush them down a toilet.      Open and remove one cloth at a time from the package.    Leave the cloth unfolded and  begin the bathing.  Massage the skin with the cloths using gentle pressure to remove bacteria.  Do not scrub harshly.   Follow the steps below with one 2% CHG cloth per area (6 total cloths).  One cloth for neck, shoulders and chest.  One cloth for both arms, hands, fingers and underarms (do underarms last).  One cloth for the abdomen followed by groin.  One cloth for right leg and foot including between the toes.  One cloth for left leg and foot including between the toes.  The last cloth is to be used for the back of the neck, back and buttocks.    Allow the CHG to air dry 3 minutes on the skin which will give it time to work and decrease the chance of irritation.  The skin may feel sticky until it is dry.  Do not rinse with water or any other liquid or you will lose the beneficial effects of the CHG.  If mild skin irritation occurs, do rinse the skin to remove the CHG.  Report this to the nurse at time of admission.  Do not apply lotions, creams, ointments, deodorants or perfumes after using the clothes. Dress in clean clothes before coming to the hospital.

## 2025-01-27 NOTE — TELEPHONE ENCOUNTER
Message was left on voicemail to confirm colonoscopy on Wednesday at 10:00 with a 9:00a.m. arrival time.  Patient was given my office number to call.

## 2025-01-29 ENCOUNTER — ANESTHESIA (OUTPATIENT)
Dept: GASTROENTEROLOGY | Facility: HOSPITAL | Age: 65
End: 2025-01-29
Payer: COMMERCIAL

## 2025-01-29 ENCOUNTER — HOSPITAL ENCOUNTER (OUTPATIENT)
Facility: HOSPITAL | Age: 65
Setting detail: HOSPITAL OUTPATIENT SURGERY
Discharge: HOME | End: 2025-01-29
Attending: COLON & RECTAL SURGERY | Admitting: COLON & RECTAL SURGERY
Payer: COMMERCIAL

## 2025-01-29 ENCOUNTER — ANESTHESIA EVENT (OUTPATIENT)
Dept: GASTROENTEROLOGY | Facility: HOSPITAL | Age: 65
End: 2025-01-29
Payer: COMMERCIAL

## 2025-01-29 VITALS
OXYGEN SATURATION: 98 % | RESPIRATION RATE: 16 BRPM | BODY MASS INDEX: 20.97 KG/M2 | DIASTOLIC BLOOD PRESSURE: 82 MMHG | WEIGHT: 146.5 LBS | SYSTOLIC BLOOD PRESSURE: 132 MMHG | HEIGHT: 70 IN | HEART RATE: 79 BPM

## 2025-01-29 PROCEDURE — 25010000002 PROPOFOL 200 MG/20ML EMULSION: Performed by: ANESTHESIOLOGY

## 2025-01-29 PROCEDURE — 25010000002 PROPOFOL 1000 MG/100ML EMULSION: Performed by: ANESTHESIOLOGY

## 2025-01-29 PROCEDURE — 45378 DIAGNOSTIC COLONOSCOPY: CPT | Performed by: COLON & RECTAL SURGERY

## 2025-01-29 PROCEDURE — 25010000002 LIDOCAINE 2% SOLUTION: Performed by: ANESTHESIOLOGY

## 2025-01-29 PROCEDURE — 25810000003 LACTATED RINGERS PER 1000 ML: Performed by: COLON & RECTAL SURGERY

## 2025-01-29 RX ORDER — HYDROCODONE BITARTRATE AND ACETAMINOPHEN 5; 325 MG/1; MG/1
1 TABLET ORAL EVERY 6 HOURS PRN
COMMUNITY

## 2025-01-29 RX ORDER — PROPOFOL 10 MG/ML
INJECTION, EMULSION INTRAVENOUS AS NEEDED
Status: DISCONTINUED | OUTPATIENT
Start: 2025-01-29 | End: 2025-01-29 | Stop reason: SURG

## 2025-01-29 RX ORDER — PROPOFOL 10 MG/ML
INJECTION, EMULSION INTRAVENOUS CONTINUOUS PRN
Status: DISCONTINUED | OUTPATIENT
Start: 2025-01-29 | End: 2025-01-29 | Stop reason: SURG

## 2025-01-29 RX ORDER — CETIRIZINE HYDROCHLORIDE 5 MG/1
5 TABLET ORAL DAILY
COMMUNITY

## 2025-01-29 RX ORDER — LIDOCAINE HYDROCHLORIDE 20 MG/ML
INJECTION, SOLUTION INFILTRATION; PERINEURAL AS NEEDED
Status: DISCONTINUED | OUTPATIENT
Start: 2025-01-29 | End: 2025-01-29 | Stop reason: SURG

## 2025-01-29 RX ORDER — SODIUM CHLORIDE, SODIUM LACTATE, POTASSIUM CHLORIDE, CALCIUM CHLORIDE 600; 310; 30; 20 MG/100ML; MG/100ML; MG/100ML; MG/100ML
30 INJECTION, SOLUTION INTRAVENOUS CONTINUOUS PRN
Status: DISCONTINUED | OUTPATIENT
Start: 2025-01-29 | End: 2025-01-29 | Stop reason: HOSPADM

## 2025-01-29 RX ADMIN — SODIUM CHLORIDE, POTASSIUM CHLORIDE, SODIUM LACTATE AND CALCIUM CHLORIDE 30 ML/HR: 600; 310; 30; 20 INJECTION, SOLUTION INTRAVENOUS at 09:30

## 2025-01-29 RX ADMIN — LIDOCAINE HYDROCHLORIDE 60 MG: 20 INJECTION, SOLUTION INFILTRATION; PERINEURAL at 09:38

## 2025-01-29 RX ADMIN — PROPOFOL 250 MCG/KG/MIN: 10 INJECTION, EMULSION INTRAVENOUS at 09:39

## 2025-01-29 RX ADMIN — PROPOFOL INJECTABLE EMULSION 80 MG: 10 INJECTION, EMULSION INTRAVENOUS at 09:40

## 2025-01-29 NOTE — ANESTHESIA PREPROCEDURE EVALUATION
Anesthesia Evaluation     NPO Solid Status: > 8 hours             Airway   Mallampati: II  TM distance: >3 FB  Neck ROM: full  no difficulty expected  Dental - normal exam     Pulmonary - normal exam   Cardiovascular - normal exam        Neuro/Psych  (+) psychiatric history  GI/Hepatic/Renal/Endo      Musculoskeletal     Abdominal    Substance History      OB/GYN          Other   arthritis,                 Anesthesia Plan    ASA 2     MAC     intravenous induction       CODE STATUS:

## 2025-01-29 NOTE — ANESTHESIA POSTPROCEDURE EVALUATION
Patient: Sofia Mendez    Procedure Summary       Date: 01/29/25 Room / Location: [facility] ENDOSCOPY 6 /  [facility]    Anesthesia Start: 0937 Anesthesia Stop: 0958    Procedure: COLONOSCOPY TO CECUM/TI Diagnosis:       Screening for colon cancer      (Screening for colon cancer [Z12.11])    Surgeons: Merary Tinsley MD Provider: Enoc Pope MD    Anesthesia Type: MAC ASA Status: 2            Anesthesia Type: MAC    Vitals  Vitals Value Taken Time   /82 01/29/25 1017   Temp     Pulse 75 01/29/25 1018   Resp 16 01/29/25 1017   SpO2 98 % 01/29/25 1018   Vitals shown include unfiled device data.        Post Anesthesia Care and Evaluation    Patient location during evaluation: bedside  Patient participation: complete - patient participated  Level of consciousness: awake  Pain management: adequate    Airway patency: patent  Anesthetic complications: No anesthetic complications  PONV Status: controlled  Cardiovascular status: acceptable  Respiratory status: acceptable  Hydration status: acceptable    Comments: --------------------            01/29/25               1017     --------------------   BP:       132/82     Pulse:      79       Resp:       16       SpO2:      98%      --------------------

## 2025-01-29 NOTE — DISCHARGE INSTRUCTIONS
For the next 24 hours patient needs to be with a responsible adult.    For 24 hours DO NOT drive, operate machinery, appliances, drink alcohol, make important decisions or sign legal documents.    Start with a light or bland diet if you are feeling sick to your stomach otherwise advance to regular diet as tolerated.    Follow recommendations on procedure report if provided by your doctor.    Call Dr Tinsley for problems [phone].    Problems may include but not limited to: large amounts of bleeding, trouble breathing, repeated vomiting, severe unrelieved pain, fever or chills.

## 2025-01-29 NOTE — H&P
"Sofia Mendez is a 64 y.o. female  who is referred by Merary Tinsley MD for a colonoscopy. She   has an indications: screening for colon cancer.     She denies any change in bowel function, melena, or hematochezia.    Past Medical History:   Diagnosis Date    Acute kidney injury 12/2023    Allergic     Anxiety     Arthritis     COVID     Depression     History of COVID-19     History of DVT (deep vein thrombosis) 2023    lower extremity    History of melanoma     Osteopenia     Presence of IVC filter     THROMBUS/SCHED TO HAVE REMOVED 2/4/25    Seasonal allergies        Past Surgical History:   Procedure Laterality Date    APPENDECTOMY      COLONOSCOPY  2014    Normal    COLONOSCOPY      FOOT SURGERY Right     HIP SURGERY Right     \"REATTACHED GLUTEUS MEDIUS\"    OTHER SURGICAL HISTORY      Eyebrow lift    SKIN CANCER EXCISION      VENA CAVA FILTER INSERTION  12/2023    WISDOM TOOTH EXTRACTION         Medications Prior to Admission   Medication Sig Dispense Refill Last Dose/Taking    alendronate (FOSAMAX) 70 MG tablet Take 1 tablet by mouth Every 7 (Seven) Days. FRIDAYS   Past Week    apixaban (Eliquis) 5 MG tablet tablet Take 1 tablet by mouth 2 (Two) Times a Day. (Patient taking differently: Take 1 tablet by mouth 2 (Two) Times a Day. HOLD 48 HR PRIOR TO SURGERY PER MD INSTRUCTIONS) 60 tablet 2 1/29/2025 Morning    cetirizine (zyrTEC) 5 MG tablet Take 1 tablet by mouth Daily.   1/29/2025 Morning    HYDROcodone-acetaminophen (NORCO) 5-325 MG per tablet Take 1 tablet by mouth Every 6 (Six) Hours As Needed.   1/28/2025    Vitamin D-Vitamin K (VITAMIN D2 + K1 PO) Take 1 tablet by mouth Daily. PT HOLDING FOR SURGERY   1/28/2025    Doxylamine Succinate, Sleep, (SLEEP AID PO) Take 1 tablet by mouth At Night As Needed.   1/26/2025    fexofenadine (ALLEGRA) 60 MG tablet Take 1 tablet by mouth As Needed.       fluorouracil (EFUDEX) 5 % cream Apply 1 Application topically to the appropriate area as directed As Needed.       " tretinoin (RETIN-A) 0.05 % cream Apply  topically Every Night. (Patient taking differently: Apply 1 Application topically to the appropriate area as directed Every Night.) 45 g 3        Allergies   Allergen Reactions    Iodinated Contrast Media Itching and Rash       Family History   Problem Relation Age of Onset    No Known Problems Mother     Heart disease Father     Heart attack Father     No Known Problems Brother     Malfabricio Hyperthermia Neg Hx        Social History     Socioeconomic History    Marital status: Single    Number of children: 2   Tobacco Use    Smoking status: Former     Current packs/day: 0.00     Types: Cigarettes     Quit date: 2023     Years since quittin.4    Smokeless tobacco: Never   Vaping Use    Vaping status: Never Used   Substance and Sexual Activity    Alcohol use: Yes     Comment: 2x/day    Drug use: Yes     Frequency: 2.0 times per week     Types: Marijuana     Comment: ADVISED TO STOP PRIOR TO SURGERY    Sexual activity: Defer       ROS    Vitals:    25 0926   BP: 130/90   Pulse: 77   Resp: 21   SpO2: 99%         Physical Exam  Constitutional:       Appearance: She is well-developed.   HENT:      Head: Normocephalic and atraumatic.   Eyes:      Pupils: Pupils are equal, round, and reactive to light.   Cardiovascular:      Rate and Rhythm: Regular rhythm.   Pulmonary:      Effort: Pulmonary effort is normal.   Abdominal:      General: There is no distension.      Palpations: Abdomen is soft.   Musculoskeletal:         General: Normal range of motion.   Skin:     General: Skin is warm and dry.   Neurological:      Mental Status: She is alert and oriented to person, place, and time.   Psychiatric:         Thought Content: Thought content normal.         Judgment: Judgment normal.           Assessment & Plan      indications: screening for colon cancer         I recommend colonoscopy.  I described risks, benefits of the procedure with the patient including but not limited to  bleeding, infection, possibility of perforation and possible polypectomy. All of the patient's questions were answered and they would like to proceed with the above recommendations.

## 2025-02-04 ENCOUNTER — ANESTHESIA EVENT (OUTPATIENT)
Dept: PERIOP | Facility: HOSPITAL | Age: 65
End: 2025-02-04
Payer: COMMERCIAL

## 2025-02-04 ENCOUNTER — HOSPITAL ENCOUNTER (OUTPATIENT)
Facility: HOSPITAL | Age: 65
Setting detail: HOSPITAL OUTPATIENT SURGERY
Discharge: HOME | End: 2025-02-04
Attending: SURGERY | Admitting: SURGERY
Payer: COMMERCIAL

## 2025-02-04 ENCOUNTER — APPOINTMENT (OUTPATIENT)
Dept: GENERAL RADIOLOGY | Facility: HOSPITAL | Age: 65
End: 2025-02-04
Payer: COMMERCIAL

## 2025-02-04 ENCOUNTER — ANESTHESIA (OUTPATIENT)
Dept: PERIOP | Facility: HOSPITAL | Age: 65
End: 2025-02-04
Payer: COMMERCIAL

## 2025-02-04 VITALS
RESPIRATION RATE: 16 BRPM | OXYGEN SATURATION: 100 % | HEART RATE: 61 BPM | DIASTOLIC BLOOD PRESSURE: 81 MMHG | SYSTOLIC BLOOD PRESSURE: 133 MMHG | TEMPERATURE: 97.6 F

## 2025-02-04 DIAGNOSIS — Z95.828 PRESENCE OF INFERIOR VENA CAVA FILTER: ICD-10-CM

## 2025-02-04 LAB
ABO GROUP BLD: NORMAL
BLD GP AB SCN SERPL QL: NEGATIVE
RH BLD: POSITIVE
T&S EXPIRATION DATE: NORMAL

## 2025-02-04 PROCEDURE — C1757 CATH, THROMBECTOMY/EMBOLECT: HCPCS | Performed by: SURGERY

## 2025-02-04 PROCEDURE — C1894 INTRO/SHEATH, NON-LASER: HCPCS | Performed by: SURGERY

## 2025-02-04 PROCEDURE — 25810000003 LACTATED RINGERS PER 1000 ML: Performed by: STUDENT IN AN ORGANIZED HEALTH CARE EDUCATION/TRAINING PROGRAM

## 2025-02-04 PROCEDURE — 25010000002 BUPIVACAINE (PF) 0.25 % SOLUTION 30 ML VIAL: Performed by: SURGERY

## 2025-02-04 PROCEDURE — 25010000002 FENTANYL CITRATE (PF) 50 MCG/ML SOLUTION: Performed by: NURSE ANESTHETIST, CERTIFIED REGISTERED

## 2025-02-04 PROCEDURE — C1887 CATHETER, GUIDING: HCPCS | Performed by: SURGERY

## 2025-02-04 PROCEDURE — C1769 GUIDE WIRE: HCPCS | Performed by: SURGERY

## 2025-02-04 PROCEDURE — 86901 BLOOD TYPING SEROLOGIC RH(D): CPT | Performed by: SURGERY

## 2025-02-04 PROCEDURE — 25010000002 MIDAZOLAM PER 1 MG: Performed by: STUDENT IN AN ORGANIZED HEALTH CARE EDUCATION/TRAINING PROGRAM

## 2025-02-04 PROCEDURE — 25010000002 SUGAMMADEX 200 MG/2ML SOLUTION: Performed by: NURSE ANESTHETIST, CERTIFIED REGISTERED

## 2025-02-04 PROCEDURE — 25010000002 LIDOCAINE (CARDIAC): Performed by: NURSE ANESTHETIST, CERTIFIED REGISTERED

## 2025-02-04 PROCEDURE — C1773 RET DEV, INSERTABLE: HCPCS | Performed by: SURGERY

## 2025-02-04 PROCEDURE — 25010000002 MIDAZOLAM PER 1 MG: Performed by: NURSE ANESTHETIST, CERTIFIED REGISTERED

## 2025-02-04 PROCEDURE — 86900 BLOOD TYPING SEROLOGIC ABO: CPT | Performed by: SURGERY

## 2025-02-04 PROCEDURE — 25010000002 ONDANSETRON PER 1 MG: Performed by: NURSE ANESTHETIST, CERTIFIED REGISTERED

## 2025-02-04 PROCEDURE — 25010000002 DEXAMETHASONE SODIUM PHOSPHATE 20 MG/5ML SOLUTION: Performed by: NURSE ANESTHETIST, CERTIFIED REGISTERED

## 2025-02-04 PROCEDURE — 25010000002 PROPOFOL 10 MG/ML EMULSION: Performed by: NURSE ANESTHETIST, CERTIFIED REGISTERED

## 2025-02-04 PROCEDURE — 25010000002 DIPHENHYDRAMINE PER 50 MG: Performed by: NURSE ANESTHETIST, CERTIFIED REGISTERED

## 2025-02-04 PROCEDURE — 25010000002 HEPARIN (PORCINE) PER 1000 UNITS: Performed by: SURGERY

## 2025-02-04 PROCEDURE — 25510000001 IOPAMIDOL PER 1 ML: Performed by: SURGERY

## 2025-02-04 PROCEDURE — 25010000002 CEFAZOLIN PER 500 MG: Performed by: SURGERY

## 2025-02-04 PROCEDURE — 25010000002 GLYCOPYRROLATE 0.2 MG/ML SOLUTION: Performed by: NURSE ANESTHETIST, CERTIFIED REGISTERED

## 2025-02-04 PROCEDURE — 86850 RBC ANTIBODY SCREEN: CPT | Performed by: SURGERY

## 2025-02-04 PROCEDURE — 25010000002 HEPARIN (PORCINE) PER 1000 UNITS: Performed by: NURSE ANESTHETIST, CERTIFIED REGISTERED

## 2025-02-04 PROCEDURE — 88304 TISSUE EXAM BY PATHOLOGIST: CPT | Performed by: SURGERY

## 2025-02-04 PROCEDURE — 25010000002 HYDROCORTISONE SOD SUC (PF) 100 MG RECONSTITUTED SOLUTION: Performed by: NURSE ANESTHETIST, CERTIFIED REGISTERED

## 2025-02-04 PROCEDURE — 25010000002 HYDROMORPHONE PER 4 MG: Performed by: NURSE ANESTHETIST, CERTIFIED REGISTERED

## 2025-02-04 PROCEDURE — 25010000002 LIDOCAINE 1 % SOLUTION 20 ML VIAL: Performed by: SURGERY

## 2025-02-04 RX ORDER — DIPHENHYDRAMINE HYDROCHLORIDE 50 MG/ML
12.5 INJECTION INTRAMUSCULAR; INTRAVENOUS
Status: DISCONTINUED | OUTPATIENT
Start: 2025-02-04 | End: 2025-02-04 | Stop reason: HOSPADM

## 2025-02-04 RX ORDER — PROMETHAZINE HYDROCHLORIDE 25 MG/1
25 SUPPOSITORY RECTAL ONCE AS NEEDED
Status: DISCONTINUED | OUTPATIENT
Start: 2025-02-04 | End: 2025-02-04 | Stop reason: HOSPADM

## 2025-02-04 RX ORDER — OXYCODONE AND ACETAMINOPHEN 7.5; 325 MG/1; MG/1
1 TABLET ORAL EVERY 4 HOURS PRN
Status: DISCONTINUED | OUTPATIENT
Start: 2025-02-04 | End: 2025-02-04 | Stop reason: HOSPADM

## 2025-02-04 RX ORDER — DIPHENHYDRAMINE HYDROCHLORIDE 50 MG/ML
INJECTION INTRAMUSCULAR; INTRAVENOUS AS NEEDED
Status: DISCONTINUED | OUTPATIENT
Start: 2025-02-04 | End: 2025-02-04 | Stop reason: SURG

## 2025-02-04 RX ORDER — LABETALOL HYDROCHLORIDE 5 MG/ML
5 INJECTION, SOLUTION INTRAVENOUS
Status: DISCONTINUED | OUTPATIENT
Start: 2025-02-04 | End: 2025-02-04 | Stop reason: HOSPADM

## 2025-02-04 RX ORDER — FLUMAZENIL 0.1 MG/ML
0.2 INJECTION INTRAVENOUS AS NEEDED
Status: DISCONTINUED | OUTPATIENT
Start: 2025-02-04 | End: 2025-02-04 | Stop reason: HOSPADM

## 2025-02-04 RX ORDER — FENTANYL CITRATE 50 UG/ML
50 INJECTION, SOLUTION INTRAMUSCULAR; INTRAVENOUS
Status: DISCONTINUED | OUTPATIENT
Start: 2025-02-04 | End: 2025-02-04 | Stop reason: HOSPADM

## 2025-02-04 RX ORDER — HYDROMORPHONE HYDROCHLORIDE 1 MG/ML
0.5 INJECTION, SOLUTION INTRAMUSCULAR; INTRAVENOUS; SUBCUTANEOUS
Status: DISCONTINUED | OUTPATIENT
Start: 2025-02-04 | End: 2025-02-04 | Stop reason: HOSPADM

## 2025-02-04 RX ORDER — HYDRALAZINE HYDROCHLORIDE 20 MG/ML
5 INJECTION INTRAMUSCULAR; INTRAVENOUS
Status: DISCONTINUED | OUTPATIENT
Start: 2025-02-04 | End: 2025-02-04 | Stop reason: HOSPADM

## 2025-02-04 RX ORDER — IPRATROPIUM BROMIDE AND ALBUTEROL SULFATE 2.5; .5 MG/3ML; MG/3ML
3 SOLUTION RESPIRATORY (INHALATION) ONCE AS NEEDED
Status: DISCONTINUED | OUTPATIENT
Start: 2025-02-04 | End: 2025-02-04 | Stop reason: HOSPADM

## 2025-02-04 RX ORDER — ATROPINE SULFATE 0.4 MG/ML
0.4 INJECTION, SOLUTION INTRAMUSCULAR; INTRAVENOUS; SUBCUTANEOUS ONCE AS NEEDED
Status: DISCONTINUED | OUTPATIENT
Start: 2025-02-04 | End: 2025-02-04 | Stop reason: HOSPADM

## 2025-02-04 RX ORDER — HYDROCORTISONE SODIUM SUCCINATE 100 MG/2ML
INJECTION INTRAMUSCULAR; INTRAVENOUS AS NEEDED
Status: DISCONTINUED | OUTPATIENT
Start: 2025-02-04 | End: 2025-02-04 | Stop reason: SURG

## 2025-02-04 RX ORDER — SODIUM CHLORIDE 0.9 % (FLUSH) 0.9 %
3-10 SYRINGE (ML) INJECTION AS NEEDED
Status: DISCONTINUED | OUTPATIENT
Start: 2025-02-04 | End: 2025-02-04 | Stop reason: HOSPADM

## 2025-02-04 RX ORDER — HEPARIN SODIUM 1000 [USP'U]/ML
INJECTION, SOLUTION INTRAVENOUS; SUBCUTANEOUS AS NEEDED
Status: DISCONTINUED | OUTPATIENT
Start: 2025-02-04 | End: 2025-02-04 | Stop reason: SURG

## 2025-02-04 RX ORDER — PHENYLEPHRINE HCL IN 0.9% NACL 1 MG/10 ML
SYRINGE (ML) INTRAVENOUS AS NEEDED
Status: DISCONTINUED | OUTPATIENT
Start: 2025-02-04 | End: 2025-02-04 | Stop reason: SURG

## 2025-02-04 RX ORDER — NALOXONE HCL 0.4 MG/ML
0.2 VIAL (ML) INJECTION AS NEEDED
Status: DISCONTINUED | OUTPATIENT
Start: 2025-02-04 | End: 2025-02-04 | Stop reason: HOSPADM

## 2025-02-04 RX ORDER — SODIUM CHLORIDE 0.9 % (FLUSH) 0.9 %
3 SYRINGE (ML) INJECTION EVERY 12 HOURS SCHEDULED
Status: DISCONTINUED | OUTPATIENT
Start: 2025-02-04 | End: 2025-02-04 | Stop reason: HOSPADM

## 2025-02-04 RX ORDER — IOPAMIDOL 510 MG/ML
300 INJECTION, SOLUTION INTRAVASCULAR
Status: COMPLETED | OUTPATIENT
Start: 2025-02-04 | End: 2025-02-04

## 2025-02-04 RX ORDER — ONDANSETRON 2 MG/ML
4 INJECTION INTRAMUSCULAR; INTRAVENOUS ONCE AS NEEDED
Status: DISCONTINUED | OUTPATIENT
Start: 2025-02-04 | End: 2025-02-04 | Stop reason: HOSPADM

## 2025-02-04 RX ORDER — METOPROLOL TARTRATE 1 MG/ML
INJECTION, SOLUTION INTRAVENOUS AS NEEDED
Status: DISCONTINUED | OUTPATIENT
Start: 2025-02-04 | End: 2025-02-04 | Stop reason: SURG

## 2025-02-04 RX ORDER — PROPOFOL 10 MG/ML
VIAL (ML) INTRAVENOUS AS NEEDED
Status: DISCONTINUED | OUTPATIENT
Start: 2025-02-04 | End: 2025-02-04 | Stop reason: SURG

## 2025-02-04 RX ORDER — EPHEDRINE SULFATE 50 MG/ML
5 INJECTION, SOLUTION INTRAVENOUS ONCE AS NEEDED
Status: DISCONTINUED | OUTPATIENT
Start: 2025-02-04 | End: 2025-02-04 | Stop reason: HOSPADM

## 2025-02-04 RX ORDER — HYDROCODONE BITARTRATE AND ACETAMINOPHEN 5; 325 MG/1; MG/1
1 TABLET ORAL EVERY 6 HOURS PRN
Qty: 20 TABLET | Refills: 0 | Status: SHIPPED | OUTPATIENT
Start: 2025-02-04

## 2025-02-04 RX ORDER — SODIUM CHLORIDE, SODIUM LACTATE, POTASSIUM CHLORIDE, CALCIUM CHLORIDE 600; 310; 30; 20 MG/100ML; MG/100ML; MG/100ML; MG/100ML
9 INJECTION, SOLUTION INTRAVENOUS CONTINUOUS
Status: DISCONTINUED | OUTPATIENT
Start: 2025-02-04 | End: 2025-02-04 | Stop reason: HOSPADM

## 2025-02-04 RX ORDER — FAMOTIDINE 10 MG/ML
20 INJECTION, SOLUTION INTRAVENOUS ONCE
Status: COMPLETED | OUTPATIENT
Start: 2025-02-04 | End: 2025-02-04

## 2025-02-04 RX ORDER — PROMETHAZINE HYDROCHLORIDE 25 MG/1
25 TABLET ORAL ONCE AS NEEDED
Status: DISCONTINUED | OUTPATIENT
Start: 2025-02-04 | End: 2025-02-04 | Stop reason: HOSPADM

## 2025-02-04 RX ORDER — GLYCOPYRROLATE 0.2 MG/ML
INJECTION INTRAMUSCULAR; INTRAVENOUS AS NEEDED
Status: DISCONTINUED | OUTPATIENT
Start: 2025-02-04 | End: 2025-02-04 | Stop reason: SURG

## 2025-02-04 RX ORDER — ROCURONIUM BROMIDE 10 MG/ML
INJECTION, SOLUTION INTRAVENOUS AS NEEDED
Status: DISCONTINUED | OUTPATIENT
Start: 2025-02-04 | End: 2025-02-04 | Stop reason: SURG

## 2025-02-04 RX ORDER — MIDAZOLAM HYDROCHLORIDE 1 MG/ML
INJECTION, SOLUTION INTRAMUSCULAR; INTRAVENOUS AS NEEDED
Status: DISCONTINUED | OUTPATIENT
Start: 2025-02-04 | End: 2025-02-04 | Stop reason: SURG

## 2025-02-04 RX ORDER — MIDAZOLAM HYDROCHLORIDE 1 MG/ML
1 INJECTION, SOLUTION INTRAMUSCULAR; INTRAVENOUS
Status: DISCONTINUED | OUTPATIENT
Start: 2025-02-04 | End: 2025-02-04 | Stop reason: HOSPADM

## 2025-02-04 RX ORDER — ONDANSETRON 2 MG/ML
INJECTION INTRAMUSCULAR; INTRAVENOUS AS NEEDED
Status: DISCONTINUED | OUTPATIENT
Start: 2025-02-04 | End: 2025-02-04 | Stop reason: SURG

## 2025-02-04 RX ORDER — HYDROCODONE BITARTRATE AND ACETAMINOPHEN 5; 325 MG/1; MG/1
1 TABLET ORAL ONCE AS NEEDED
Status: COMPLETED | OUTPATIENT
Start: 2025-02-04 | End: 2025-02-04

## 2025-02-04 RX ORDER — LIDOCAINE HYDROCHLORIDE 10 MG/ML
0.5 INJECTION, SOLUTION INFILTRATION; PERINEURAL ONCE AS NEEDED
Status: DISCONTINUED | OUTPATIENT
Start: 2025-02-04 | End: 2025-02-04 | Stop reason: HOSPADM

## 2025-02-04 RX ORDER — FENTANYL CITRATE 50 UG/ML
INJECTION, SOLUTION INTRAMUSCULAR; INTRAVENOUS AS NEEDED
Status: DISCONTINUED | OUTPATIENT
Start: 2025-02-04 | End: 2025-02-04 | Stop reason: SURG

## 2025-02-04 RX ORDER — DEXAMETHASONE SODIUM PHOSPHATE 4 MG/ML
INJECTION, SOLUTION INTRA-ARTICULAR; INTRALESIONAL; INTRAMUSCULAR; INTRAVENOUS; SOFT TISSUE AS NEEDED
Status: DISCONTINUED | OUTPATIENT
Start: 2025-02-04 | End: 2025-02-04 | Stop reason: SURG

## 2025-02-04 RX ADMIN — HYDROMORPHONE HYDROCHLORIDE 0.5 MG: 1 INJECTION, SOLUTION INTRAMUSCULAR; INTRAVENOUS; SUBCUTANEOUS at 12:44

## 2025-02-04 RX ADMIN — HYDROMORPHONE HYDROCHLORIDE 0.5 MG: 1 INJECTION, SOLUTION INTRAMUSCULAR; INTRAVENOUS; SUBCUTANEOUS at 12:27

## 2025-02-04 RX ADMIN — Medication 200 MCG: at 11:11

## 2025-02-04 RX ADMIN — PROPOFOL 100 MG: 10 INJECTION, EMULSION INTRAVENOUS at 10:26

## 2025-02-04 RX ADMIN — HYDROCODONE BITARTRATE AND ACETAMINOPHEN 1 TABLET: 5; 325 TABLET ORAL at 12:44

## 2025-02-04 RX ADMIN — SODIUM CHLORIDE, POTASSIUM CHLORIDE, SODIUM LACTATE AND CALCIUM CHLORIDE 9 ML/HR: 600; 310; 30; 20 INJECTION, SOLUTION INTRAVENOUS at 10:02

## 2025-02-04 RX ADMIN — METOPROLOL TARTRATE 5 MG: 5 INJECTION INTRAVENOUS at 10:40

## 2025-02-04 RX ADMIN — IOPAMIDOL 205 ML: 510 INJECTION, SOLUTION INTRAVASCULAR at 12:00

## 2025-02-04 RX ADMIN — Medication 150 MCG: at 11:19

## 2025-02-04 RX ADMIN — Medication 150 MCG: at 11:35

## 2025-02-04 RX ADMIN — DEXAMETHASONE SODIUM PHOSPHATE 10 MG: 4 INJECTION, SOLUTION INTRAMUSCULAR; INTRAVENOUS at 10:27

## 2025-02-04 RX ADMIN — HEPARIN SODIUM 10000 UNITS: 1000 INJECTION INTRAVENOUS; SUBCUTANEOUS at 10:56

## 2025-02-04 RX ADMIN — DIPHENHYDRAMINE HYDROCHLORIDE 12.5 MG: 50 INJECTION, SOLUTION INTRAMUSCULAR; INTRAVENOUS at 10:25

## 2025-02-04 RX ADMIN — FENTANYL CITRATE 50 MCG: 50 INJECTION, SOLUTION INTRAMUSCULAR; INTRAVENOUS at 10:26

## 2025-02-04 RX ADMIN — HYDROCORTISONE SODIUM SUCCINATE 100 MG: 100 INJECTION, POWDER, FOR SOLUTION INTRAMUSCULAR; INTRAVENOUS at 12:13

## 2025-02-04 RX ADMIN — MIDAZOLAM 2 MG: 1 INJECTION INTRAMUSCULAR; INTRAVENOUS at 10:21

## 2025-02-04 RX ADMIN — SUGAMMADEX 200 MG: 100 INJECTION, SOLUTION INTRAVENOUS at 11:53

## 2025-02-04 RX ADMIN — ROCURONIUM BROMIDE 70 MG: 10 INJECTION INTRAVENOUS at 10:27

## 2025-02-04 RX ADMIN — GLYCOPYRROLATE 0.2 MG: 0.2 INJECTION INTRAMUSCULAR; INTRAVENOUS at 10:21

## 2025-02-04 RX ADMIN — ONDANSETRON 4 MG: 2 INJECTION INTRAMUSCULAR; INTRAVENOUS at 11:52

## 2025-02-04 RX ADMIN — LIDOCAINE HYDROCHLORIDE 80 MG: 20 INJECTION, SOLUTION INTRAVENOUS at 10:26

## 2025-02-04 RX ADMIN — MIDAZOLAM 1 MG: 1 INJECTION INTRAMUSCULAR; INTRAVENOUS at 10:02

## 2025-02-04 RX ADMIN — FAMOTIDINE 20 MG: 10 INJECTION INTRAVENOUS at 10:02

## 2025-02-04 RX ADMIN — CEFAZOLIN 2000 MG: 2 INJECTION, POWDER, FOR SOLUTION INTRAMUSCULAR; INTRAVENOUS at 10:11

## 2025-02-04 NOTE — ANESTHESIA PROCEDURE NOTES
Airway  Urgency: elective    Date/Time: 2/4/2025 10:29 AM  Airway not difficult    General Information and Staff    Patient location during procedure: OR  Anesthesiologist: Guerrero Panchal MD  CRNA/CAA: Dannie Ferreira CRNA    Indications and Patient Condition  Indications for airway management: airway protection    Preoxygenated: yes  Mask difficulty assessment: 2 - vent by mask + OA or adjuvant +/- NMBA    Final Airway Details  Final airway type: endotracheal airway      Successful airway: ETT  Cuffed: yes   Successful intubation technique: direct laryngoscopy  Facilitating devices/methods: intubating stylet  Endotracheal tube insertion site: oral  Blade: Brandon  Blade size: 3  ETT size (mm): 7.0  Cormack-Lehane Classification: grade I - full view of glottis  Placement verified by: chest auscultation and capnometry   Inital cuff pressure (cm H2O): 20  Cuff volume (mL): 7  Measured from: lips  ETT/EBT  to lips (cm): 22  Number of attempts at approach: 1  Assessment: lips, teeth, and gum same as pre-op and atraumatic intubation

## 2025-02-04 NOTE — BRIEF OP NOTE
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]    Procedure/CPT® Codes:  N[place] RTRVL INTRVAS VC FILTR W/WO ACS VSL SELXN RS&I [38695]      Procedure(s):  VENA CAVA FILTER REMOVAL with inferior vena cava thrombectomy              Surgeon(s):  Eligio Perdue MD    Anesthesia: General    Staff:   Cell Saver : Denisse Haskins  Circulator: Manuela Mae RN; Porsha Mendoza RN  Scrub Person: Rhonda Townsend  Assistant: Lara Grubbs CSA  Vascular Radiology Technician: Corey Meehan  Assistant: Lara Grubbs CSA      Estimated Blood Loss: <500ml    Urine Voided: * No values recorded between 2/4/2025 10:18 AM and 2/4/2025 11:57 AM *    Specimens:    Retained filter removed, chronic thrombus removed from wall of vena cava.                      Drains: * No LDAs found *    Findings: Successful removal of filter and thrombus requiring multiple modalities.  Cell Saver and blood given back intraoperatively.        Complications: None    Assistant: Lara Grubbs CSA  was responsible for performing the following activities: Retraction, Suction, Closing, Placing Dressing, and wiring catheter management  and their skilled assistance was necessary for the success of this case.    Eligio Perdue MD     Date: 2/4/2025  Time: 12:06 EST

## 2025-02-04 NOTE — ANESTHESIA PREPROCEDURE EVALUATION
Anesthesia Evaluation     Patient summary reviewed and Nursing notes reviewed   NPO Solid Status: > 8 hours  NPO Liquid Status: > 2 hours           Airway   Mallampati: II  TM distance: >3 FB  Neck ROM: full  Dental      Pulmonary - negative pulmonary ROS   Cardiovascular     ECG reviewed    (+) DVT      Neuro/Psych  (+) psychiatric history Anxiety and Depression  GI/Hepatic/Renal/Endo    (+) renal disease (Ctn 1.12)-    Musculoskeletal     Abdominal    Substance History   (+) alcohol use      Comment: Mj use   OB/GYN negative ob/gyn ROS         Other   arthritis,                       Anesthesia Plan    ASA 2     general     intravenous induction     Anesthetic plan, risks, benefits, and alternatives have been provided, discussed and informed consent has been obtained with: patient.        CODE STATUS:

## 2025-02-04 NOTE — OP NOTE
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  2) mechanical venous thrombectomy of the inferior vena cava using Clotriever XL device and suction thrombectomy with Flowtriever device  3) inferior vena cava filter removal    Surgeon: Niko Perdue MD    Assistant:  Lara HIGGINS CSA and they provided critical assistance during the case including suctioning, exposure, retraction, and reduction of blood loss.    Anesthesia: General    Staff:   Cell Saver : Denisse Haskins  Circulator: Manuela Mae RN; Porsha Mendoza RN  Scrub Person: Rhonda Townsend  Assistant: Lara Grubbs CSA  Vascular Radiology Technician: Corey Meehan    Estimated Blood Loss: 270 mL    Specimens:   Order Name Source Comment Collection Info Order Time   TYPE AND SCREEN   Collected By: Gisell Esposito RN 2/4/2025  9:44 AM     Release to patient   Routine Release        TISSUE PATHOLOGY EXAM Heart FRESH FOR PERMANENT  Collected By: Eligio Perdue MD 2/4/2025 11:53 AM     Release to patient   Routine Release             Complications: None    Findings: Successful removal of adherent wall thrombus as well as IVC filter.    Indications:    The patient is an 64 y.o. female seen for evaluation retained IVC filter with thrombus in the filter as well as apparently at the IVC level adherent to the wall.  Patient is undergoing removal of the filter and thrombus.  She understands risk benefits complications of the procedure and consents to the procedure.        Procedure:    Patient prepped and draped in a sterile manner.  Using duplex directionally accessed the right internal jugular vein placed an 8 Kiswahili sheath in this and passed a straight multi-sidehole catheter past the heart under fluoroscopy all the way down to the right iliac where iliac and inferior venacavogram was performed.  Superior venacavogram was also performed.  Noting adherent thrombus and mobile thrombus at the apex of the filter and throughout the area and filter position a pro tree of 26 Kiswahili sheath with a integrated basket filter system was deployed in the suprarenal IVC.  Through this flowtreiver 20 was used to suction thrombectomize the inferior vena cava above the filter.  Following this snare capture of the apex of the filter was performed and the filter was removed under direct fluoroscopy from the vena cava using SunModular snare system.  With filter removed angiogram confirmed no mobile thrombus was still present the filter was gone but there is a large piece of adherent thrombus at the level of the vena cava site of prior filter.  This appeared to be flow-limiting.  Concerns for embolization seen well but concerns for eventual problems with the thrombosis of the cava were raised and was felt that thrombectomy of this adherent clot was necessary.  Using a Clotriever XL, we passed it through the jugular system over a wire confirming good position of the vena cava and thrombectomize the vena cava with several passes the last which retrieved the retained chronic thrombus from the wall of the vena cava confirmed by repeating angiograms.  With this completed bolster stitch was placed in the neck and catheters and system were removed.    Radiographic Findings:  Angiographic portion of the procedure includes widely patent right iliac and inferior vena cava below the filter where there is no filling defects.  The filter itself has flow through it with a retained thrombus at the apex of the filter as  well as a large segment of thrombus in the wall vein.  There is some flow limitation at this level.  The suprarenal vena cava is patent and deployment of the Protriever basket sheath is performed.  There is good apposition of the wall by the basket..  Cava is widely patent.  Right atrium is patent without clot or filling or defect.  Suction thrombectomy of the inferior vena cava about the filter is performed with minimal improvement.  Snare capture of the filter and removal with subsequent removal of the mobile thrombus and the filter is also performed using snare capture and the large 26 Lao basket sheath.  Follow-up angiograms confirm adherent wall clot with concerns for flow limitation.  Clotriever XL thrombectomy of the vena cava with chronic thrombus performed with initial attempt not improving the situation but secondary attempt of the Clotriever XL removing the entire chronic thrombus.  Final angiograms confirm wide patency with no significant residual thrombus remaining.  No dissection no perforation no extravasation no complicating features seen.      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      Eligio Perdue MD     Date: 2/4/2025  Time: 15:09 EST

## 2025-02-04 NOTE — ANESTHESIA POSTPROCEDURE EVALUATION
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        Post Anesthesia Care and Evaluation    Level of consciousness: awake and alert  Pain management: adequate    Airway patency: patent  Anesthetic complications: No anesthetic complications  PONV Status: controlled  Cardiovascular status: blood pressure returned to baseline and acceptable  Respiratory status: acceptable  Hydration status: acceptable

## 2025-02-05 LAB
CYTO UR: NORMAL
LAB AP CASE REPORT: NORMAL
LAB AP CLINICAL INFORMATION: NORMAL
PATH REPORT.FINAL DX SPEC: NORMAL
PATH REPORT.GROSS SPEC: NORMAL

## 2025-02-06 ENCOUNTER — OFFICE VISIT (OUTPATIENT)
Age: 65
End: 2025-02-06
Payer: COMMERCIAL

## 2025-02-06 VITALS
HEIGHT: 70 IN | WEIGHT: 146.6 LBS | HEART RATE: 72 BPM | DIASTOLIC BLOOD PRESSURE: 85 MMHG | BODY MASS INDEX: 20.99 KG/M2 | SYSTOLIC BLOOD PRESSURE: 136 MMHG

## 2025-02-06 DIAGNOSIS — I82.91 CHRONIC DEEP VEIN THROMBOSIS (DVT) OF NON-EXTREMITY VEIN: Primary | ICD-10-CM

## 2025-02-06 NOTE — PROGRESS NOTES
"Patient Name: Sofia Mendez    MRN: [id number] Encounter Date: 02/06/2025      Consulting Service: Vascular Surgery    Referring Provider: No ref. provider found       CHIEF COMPLAINT:  Chief Complaint   Patient presents with    Post-op Follow-up       Subjective    HPI: Sofia Mendez is a 64 y.o. 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  Both were successful although they took some effort.  She is in first bolster stitch removal which was performed.    PAST MEDICAL HISTORY:   Past Medical History:   Diagnosis Date    Acute kidney injury 12/2023    Allergic     Anxiety     Arthritis     COVID     Depression     History of COVID-19     History of DVT (deep vein thrombosis) 2023    lower extremity    History of melanoma     Osteopenia     Presence of IVC filter     THROMBUS/SCHED TO HAVE REMOVED 2/4/25    Seasonal allergies       PAST SURGICAL HISTORY:   Past Surgical History:   Procedure Laterality Date    APPENDECTOMY      COLONOSCOPY  2014    Normal    COLONOSCOPY      COLONOSCOPY N/A 01/29/2025    MULTIPLE DIVERTICULA IN SIGMOID, RESCOPE IN 10 YRS, DR. LAI HICKS AT [facility]    FOOT SURGERY Right     HIP SURGERY Right     \"REATTACHED GLUTEUS MEDIUS\"    OTHER SURGICAL HISTORY      Eyebrow lift    SKIN CANCER EXCISION      VENA CAVA FILTER INSERTION  12/2023    VENA CAVA FILTER REMOVAL N/A 2/4/2025    Procedure: VENA CAVA FILTER REMOVAL with inferior vena cava thrombectomy;  Surgeon: Eligio Perdue MD;  Location: [facility];  Service: Vascular;  Laterality: N/A;    WISDOM TOOTH EXTRACTION        FAMILY HISTORY:   Family History   Problem Relation Age of Onset    No Known Problems Mother     Heart disease Father     Heart attack Father     No Known Problems Brother     Malig Hyperthermia Neg Hx       SOCIAL HISTORY:   Social History     Tobacco Use    Smoking status: Former     Current " "packs/day: 0.00     Types: Cigarettes     Quit date: 2023     Years since quittin.5    Smokeless tobacco: Never   Vaping Use    Vaping status: Never Used   Substance Use Topics    Alcohol use: Yes     Comment: 2x/day    Drug use: Yes     Frequency: 2.0 times per week     Types: Marijuana     Comment: ADVISED TO STOP PRIOR TO SURGERY      MEDICATIONS:   Current Outpatient Medications on File Prior to Visit   Medication Sig Dispense Refill    alendronate (FOSAMAX) 70 MG tablet Take 1 tablet by mouth Every 7 (Seven) Days.       apixaban (Eliquis) 5 MG tablet tablet Take 1 tablet by mouth 2 (Two) Times a Day. 60 tablet 2    cetirizine (zyrTEC) 5 MG tablet Take 1 tablet by mouth Daily.      Doxylamine Succinate, Sleep, (SLEEP AID PO) Take 1 tablet by mouth At Night As Needed.      fexofenadine (ALLEGRA) 60 MG tablet Take 1 tablet by mouth As Needed.      fluorouracil (EFUDEX) 5 % cream Apply 1 Application topically to the appropriate area as directed As Needed.      HYDROcodone-acetaminophen (NORCO) 5-325 MG per tablet Take 1 tablet by mouth Every 6 (Six) Hours As Needed (Pain). 20 tablet 0    melatonin 1 MG tablet Take 1 tablet by mouth.      tretinoin (RETIN-A) 0.05 % cream Apply  topically Every Night. 45 g 3    Vitamin D-Vitamin K (VITAMIN D2 + K1 PO) Take 1 tablet by mouth Daily. PT HOLDING FOR SURGERY       No current facility-administered medications on file prior to visit.       ALLERGIES: Iodinated contrast media       Objective   Vitals:    25 0842   BP: 136/85   Pulse: 72   Weight: 66.5 kg (146 lb 9.6 oz)   Height: 177.8 cm (70\")     Body mass index is 21.03 kg/m².  BMI is within normal parameters. No other follow-up for BMI required.      PHYSICAL EXAM:   Physical Exam   Right clear bolster stitch removed.  Leg stable  Result Review   LABS:    CBC          2024    12:49 2024    15:57 2025    16:13   CBC   WBC 6.90     9.26  7.24    RBC 4.43     4.67  4.45    Hemoglobin 13.7 "     14.2  13.9    Hematocrit 42.6     44.9  41.1    MCV 96.2     96.1  92.4    MCH 30.9     30.4  31.2    MCHC 32.2     31.6  33.8    RDW 13.1     13.6  12.9    Platelets 224     236  239       Details          This result is from an external source.             BMP          7/29/2024    11:17 11/5/2024    15:57 1/27/2025    16:13   BMP   BUN 17  17  17    Creatinine 1.00  0.99  1.12    Sodium 140  141  139    Potassium 3.7  3.5  3.9    Chloride 104  104  104    CO2 27.7  23.8  27.0    Calcium 8.8  8.3  8.7      Lipid Panel          2/13/2024    14:37   Lipid Panel   Total Cholesterol 218    Triglycerides 166    HDL Cholesterol 83    VLDL Cholesterol 28    LDL Cholesterol  107    LDL/HDL Ratio 1.3       INR          6/18/2024    10:04   Common Labs   INR 0.9          Details          This result is from an external source.                   Results Review:       I reviewed the patient's new clinical results.    The following radiologic or non-invasive studies have been reviewed by me: I reviewed the images of her studies in the OR with her showing her removal of the filter and clot  Duplex Venous Lower Extremity - Bilateral CAR 10/31/2024    Interpretation Summary    Normal bilateral lower extremity venous duplex scan.     No radiology results for the last 30 days.                ASSESSMENT/PLAN:   Diagnoses and all orders for this visit:    1. Chronic deep vein thrombosis (DVT) of non-extremity vein (Primary)  -     Duplex Aorta IVC Iliac Graft Limited CAR; Future       64 y.o. female with successfully removed IVC filter and removed retained vena cava chronic thrombus which is adherent to the wall.  Our plan will be in 4 to 6 weeks to do a duplex scan of the IVC and as long as this area of filter removal is stable we will plan to stop her Eliquis.  She will continue till that point in time.    I discussed the plan with the patient who is agreeable to the plan of care at this point. Thank you for this consult.    Follow Up  Return in about 6 weeks (around 3/20/2025).    Eligio Perdue MD   02/06/25

## 2025-02-21 ENCOUNTER — TELEPHONE (OUTPATIENT)
Dept: INTERNAL MEDICINE | Facility: CLINIC | Age: 65
End: 2025-02-21
Payer: COMMERCIAL

## 2025-02-21 NOTE — TELEPHONE ENCOUNTER
Pt wanted Dr Little to call in an Abx for a Possible UTI  Dr JULIAN told Pt to go to  or schedule an appt for Monday.  Pt is scheduled for Monday at 12:45p.    Pt would like to have labs done on the 1st floor to test her  Creatinine Lvls.  She will come directly to appt after blood draw.

## 2025-02-24 ENCOUNTER — OFFICE VISIT (OUTPATIENT)
Dept: INTERNAL MEDICINE | Facility: CLINIC | Age: 65
End: 2025-02-24
Payer: COMMERCIAL

## 2025-02-24 VITALS
DIASTOLIC BLOOD PRESSURE: 80 MMHG | WEIGHT: 149 LBS | SYSTOLIC BLOOD PRESSURE: 122 MMHG | HEART RATE: 77 BPM | BODY MASS INDEX: 21.33 KG/M2 | HEIGHT: 70 IN | RESPIRATION RATE: 18 BRPM | OXYGEN SATURATION: 95 %

## 2025-02-24 DIAGNOSIS — R30.0 DYSURIA: ICD-10-CM

## 2025-02-24 DIAGNOSIS — Z13.220 SCREENING FOR LIPID DISORDERS: ICD-10-CM

## 2025-02-24 DIAGNOSIS — R30.0 DYSURIA: Primary | ICD-10-CM

## 2025-02-24 DIAGNOSIS — D69.6 THROMBOCYTOPENIA: ICD-10-CM

## 2025-02-24 LAB
ALBUMIN SERPL-MCNC: 4 G/DL (ref 3.5–5.2)
ALBUMIN/GLOB SERPL: 1.7 G/DL
ALP SERPL-CCNC: 52 U/L (ref 39–117)
ALT SERPL-CCNC: 10 U/L (ref 1–33)
AST SERPL-CCNC: 19 U/L (ref 1–32)
BASOPHILS # BLD AUTO: 0.07 10*3/MM3 (ref 0–0.2)
BASOPHILS NFR BLD AUTO: 1.1 % (ref 0–1.5)
BILIRUB BLD-MCNC: NEGATIVE MG/DL
BILIRUB SERPL-MCNC: 2.3 MG/DL (ref 0–1.2)
BUN SERPL-MCNC: 18 MG/DL (ref 8–23)
BUN/CREAT SERPL: 17.8 (ref 7–25)
CALCIUM SERPL-MCNC: 9.2 MG/DL (ref 8.6–10.5)
CHLORIDE SERPL-SCNC: 104 MMOL/L (ref 98–107)
CHOLEST SERPL-MCNC: 222 MG/DL (ref 0–200)
CHOLEST/HDLC SERPL: 2.52 {RATIO}
CLARITY, POC: ABNORMAL
CO2 SERPL-SCNC: 27.3 MMOL/L (ref 22–29)
COLOR UR: ABNORMAL
CREAT SERPL-MCNC: 1.01 MG/DL (ref 0.57–1)
EGFRCR SERPLBLD CKD-EPI 2021: 62.3 ML/MIN/1.73
EOSINOPHIL # BLD AUTO: 0.27 10*3/MM3 (ref 0–0.4)
EOSINOPHIL NFR BLD AUTO: 4.4 % (ref 0.3–6.2)
ERYTHROCYTE [DISTWIDTH] IN BLOOD BY AUTOMATED COUNT: 12 % (ref 12.3–15.4)
EXPIRATION DATE: ABNORMAL
GLOBULIN SER CALC-MCNC: 2.3 GM/DL
GLUCOSE SERPL-MCNC: 87 MG/DL (ref 65–99)
GLUCOSE UR STRIP-MCNC: NEGATIVE MG/DL
HCT VFR BLD AUTO: 40.9 % (ref 34–46.6)
HDLC SERPL-MCNC: 88 MG/DL (ref 40–60)
HGB BLD-MCNC: 13.6 G/DL (ref 12–15.9)
IMM GRANULOCYTES # BLD AUTO: 0.01 10*3/MM3 (ref 0–0.05)
IMM GRANULOCYTES NFR BLD AUTO: 0.2 % (ref 0–0.5)
KETONES UR QL: ABNORMAL
LDLC SERPL CALC-MCNC: 115 MG/DL (ref 0–100)
LEUKOCYTE EST, POC: NEGATIVE
LYMPHOCYTES # BLD AUTO: 2.82 10*3/MM3 (ref 0.7–3.1)
LYMPHOCYTES NFR BLD AUTO: 45.9 % (ref 19.6–45.3)
Lab: ABNORMAL
MCH RBC QN AUTO: 31.5 PG (ref 26.6–33)
MCHC RBC AUTO-ENTMCNC: 33.3 G/DL (ref 31.5–35.7)
MCV RBC AUTO: 94.7 FL (ref 79–97)
MONOCYTES # BLD AUTO: 0.51 10*3/MM3 (ref 0.1–0.9)
MONOCYTES NFR BLD AUTO: 8.3 % (ref 5–12)
NEUTROPHILS # BLD AUTO: 2.46 10*3/MM3 (ref 1.7–7)
NEUTROPHILS NFR BLD AUTO: 40.1 % (ref 42.7–76)
NITRITE UR-MCNC: POSITIVE MG/ML
NRBC BLD AUTO-RTO: 0 /100 WBC (ref 0–0.2)
PH UR: 5.5 [PH] (ref 5–8)
PLATELET # BLD AUTO: 238 10*3/MM3 (ref 140–450)
POTASSIUM SERPL-SCNC: 3.9 MMOL/L (ref 3.5–5.2)
PROT SERPL-MCNC: 6.3 G/DL (ref 6–8.5)
PROT UR STRIP-MCNC: NEGATIVE MG/DL
RBC # BLD AUTO: 4.32 10*6/MM3 (ref 3.77–5.28)
RBC # UR STRIP: NEGATIVE /UL
SODIUM SERPL-SCNC: 141 MMOL/L (ref 136–145)
SP GR UR: 1.02 (ref 1–1.03)
TRIGL SERPL-MCNC: 113 MG/DL (ref 0–150)
UROBILINOGEN UR QL: ABNORMAL
VLDLC SERPL CALC-MCNC: 19 MG/DL (ref 5–40)
WBC # BLD AUTO: 6.14 10*3/MM3 (ref 3.4–10.8)

## 2025-02-24 PROCEDURE — 81003 URINALYSIS AUTO W/O SCOPE: CPT | Performed by: STUDENT IN AN ORGANIZED HEALTH CARE EDUCATION/TRAINING PROGRAM

## 2025-02-24 PROCEDURE — 99214 OFFICE O/P EST MOD 30 MIN: CPT | Performed by: STUDENT IN AN ORGANIZED HEALTH CARE EDUCATION/TRAINING PROGRAM

## 2025-02-24 RX ORDER — NITROFURANTOIN 25; 75 MG/1; MG/1
100 CAPSULE ORAL 2 TIMES DAILY
Qty: 10 CAPSULE | Refills: 0 | Status: SHIPPED | OUTPATIENT
Start: 2025-02-24 | End: 2025-02-24

## 2025-02-24 RX ORDER — NITROFURANTOIN 25; 75 MG/1; MG/1
100 CAPSULE ORAL 2 TIMES DAILY
Qty: 10 CAPSULE | Refills: 0 | Status: SHIPPED | OUTPATIENT
Start: 2025-02-24 | End: 2025-03-01

## 2025-02-24 NOTE — PROGRESS NOTES
"Chief Complaint  Urinary Tract Infection (Possible/Blood work )    Subjective        Sofia Mendez presents to [facility]  History of Present Illness  This is a 64-year-old female that presents with several days of dysuria that most recently began on Friday.  Her symptoms have been fluctuating off and on for several days and she is worried about the possibility urinary tract infection.  She denies fever or back pain.  She is worried about the possibility of a kidney stone or progression of infection.      Objective   Vital Signs:  /80 (BP Location: Right arm, Patient Position: Sitting, Cuff Size: Pediatric)   Pulse 77   Resp 18   Ht 177.8 cm (70\")   Wt 67.6 kg (149 lb)   SpO2 95%   BMI 21.38 kg/m²   Estimated body mass index is 21.38 kg/m² as calculated from the following:    Height as of this encounter: 177.8 cm (70\").    Weight as of this encounter: 67.6 kg (149 lb).    BMI is within normal parameters. No other follow-up for BMI required.      Physical Exam  Vitals reviewed.   Constitutional:       Appearance: Normal appearance. She is normal weight. She is not toxic-appearing.   Pulmonary:      Effort: No respiratory distress.   Neurological:      Mental Status: She is alert and oriented to person, place, and time.   Psychiatric:         Mood and Affect: Mood normal.         Thought Content: Thought content normal.         Judgment: Judgment normal.          Result Review :                  Assessment and Plan   Diagnoses and all orders for this visit:    1. Dysuria (Primary)  -     POCT urinalysis dipstick, automated  -     Cancel: Basic metabolic panel; Future  -     US Renal Bilateral; Future  -     Discontinue: nitrofurantoin, macrocrystal-monohydrate, (Macrobid) 100 MG capsule; Take 1 capsule by mouth 2 (Two) Times a Day for 5 days.  Dispense: 10 capsule; Refill: 0  -     nitrofurantoin, macrocrystal-monohydrate, (Macrobid) 100 MG capsule; Take 1 capsule by mouth 2 " (Two) Times a Day for 5 days.  Dispense: 10 capsule; Refill: 0    2. Thrombocytopenia  -     CBC & Differential    3. Screening for lipid disorders  -     Lipid Panel With / Chol / HDL Ratio  -     Comprehensive Metabolic Panel      Patient has never had any renal imaging reasonable obtain ultrasound to ensure there is not been progression of infection given several days since onset.  She is to advise with new or worsening symptoms after course of Macrobid.  Discussed urinalysis in office today being nitrite positive was concerning for infection.  Routine labs also ordered for upcoming physical next month.       Follow Up   Return in about 4 weeks (around 3/24/2025) for Annual physical.  Patient was given instructions and counseling regarding her condition or for health maintenance advice. Please see specific information pulled into the AVS if appropriate.

## 2025-02-24 NOTE — PATIENT INSTRUCTIONS
"MyChart/Telephone call/Lab results Tips:     MyChart and telephone calls are a useful part of our patient care program and is a way for us to communicate lab results and for you to request refills.  Not all medical questions are appropriate for MyChart such as new medical issues that require taking a history, performing an exam, getting labs/studies or researching medical questions needed to be addressed in the office.  Similarly, telephone calls should not be used for new problems requiring an evaluation as well.     Examples of medical issues that are APPROPRIATE for MyChart and telephone calls:  -Follow-up on problems we have already addressed in a visit such as home testing results, blood pressure readings, glucose readings  -Questions that can be answered with a simple \"yes\" or \"no\"     Communication that is NOT APPROPRIATE for MyChart:  -New problems, serious problems or urgent problems.  Urgent matters should be addressed by phone for advice, in the office, urgent care or the ER.  -Requesting COVID or bacterial infection treatments: These types of problems require an evaluation.    -[employer] messages are not email.  Staff will check messages each weekday.  We strive for a 48-hour turnaround on messaging.    -Online [employer] is not for private issues.  Messages are received first by our office staff.     Please allow up to 7 business days for lab results to be sent through [employer] to you before contacting your provider.  Sometimes we are waiting for results to get back from the lab and also your provider needs time to analyze them thoroughly before making recommendations.  While the internet has great resources, it is not a substitute for interpreting lab results.     We also ask that you not send [employer] messages and telephone calls regarding the same issue simultaneously.  This slows down the process of returning your call/message and confuses staff.     Be mindful that MyChart messages and telephone calls become " part of your permanent medical record.     Your provider cannot access your MyChart.  It is a service which communicates with the EHR (Electronic Health Record).  Sometimes there are errors in [employer] that staff and providers cannot see and these errors are not part of your EHR.  Vaccines and screening reminders have been frequently incorrect in MyChart.     Per Dr. Little, when sending messages via [employer], please be as concise and accurate as possible.  Much of our time each day is utilized looking up information for patients only to find out that it was another practice or pharmacy responsible for the issue.     When using the messaging, please use it for short and simple questions.  Anything further than that should go through the phone system, or better yet, addressed in a visit.       We appreciate your respect for the limitations and boundaries of [employer] and the telephone answering service.     Thank you,  Dr. Little           Important information to be aware of as a [place] [facility]    All routine health maintenance, refills, changes to medications or changes to treatment plan need to be addressed by myself as your PCP.    Acute illnesses (ex: colds, URI, UTI) can be addressed by another provider in our office if they have a same day appointment available. If a same day appointment is not available at your convenience please feel free to visit [facility] on the first floor if needed .  Completion/Review of paperwork require an appointment and may have additional charges.     The best way to get a hold of provider is to call the office during normal business hours, M-F 8:00-4:00 at [phone] and ask to speak to my medical assistant Colin. Provider does not work on Thursdays and will not return calls until the following day.  Please arrive in person or virtually 10-15 minutes prior to appointment to allow for registration/sign in/questionnaires.  If you are seen virtually please review after  visit summary (AVS)/patient instructions via [employer] for a summary of plan of care/changes in treatment plan.   If you are having difficulties logging on or accessing [employer] please contact the office for assistance.  Please request a refill through pharmacy or via [employer] prior to contacting office (unless a controlled substance is prescribed - you must call).   If a referral is placed, please give the office staff time to place referral to appropriate provider.  If you have not heard anything within 2 weeks please contact my office to follow-up on status of the referral.  Medication decisions and care are based upon the discretion of the provider based on their judgement and expertise.  Please treat our providers with the respect you would expect to be treated with during office visits.    No show policy:  We understand unexpected circumstances arise; however, anytime you miss your appointment we are unable to provide you appropriate care.  In addition, each appointment missed could have been used to provide care for others.  We ask that you call at least 24 hours in advance to cancel or reschedule an appointment. We would like to take this opportunity to remind you of our policy stating patients who miss THREE or more appointments without cancelling or rescheduling 24 hours in advance of the appointment may be subject to cancellation of any further visits with our clinic. Please call [phone] to reschedule your appointment. If there are reasons that make it difficult for you to keep the appointments, please call and let us know how we can help. Please understand that medication prescribing will not continue without seeing your provider.

## 2025-02-26 LAB
BACTERIA #/AREA URNS HPF: ABNORMAL /HPF
BACTERIA UR CULT: ABNORMAL
BACTERIA UR CULT: ABNORMAL
CASTS URNS MICRO: ABNORMAL
EPI CELLS #/AREA URNS HPF: ABNORMAL /HPF
OTHER ANTIBIOTIC SUSC ISLT: ABNORMAL
RBC #/AREA URNS HPF: ABNORMAL /HPF
WBC #/AREA URNS HPF: ABNORMAL /HPF

## 2025-03-18 ENCOUNTER — OFFICE VISIT (OUTPATIENT)
Dept: INTERNAL MEDICINE | Facility: CLINIC | Age: 65
End: 2025-03-18
Payer: COMMERCIAL

## 2025-03-18 VITALS
DIASTOLIC BLOOD PRESSURE: 78 MMHG | HEART RATE: 70 BPM | HEIGHT: 70 IN | WEIGHT: 149.6 LBS | RESPIRATION RATE: 20 BRPM | SYSTOLIC BLOOD PRESSURE: 136 MMHG | BODY MASS INDEX: 21.42 KG/M2 | OXYGEN SATURATION: 97 %

## 2025-03-18 DIAGNOSIS — Z00.00 ANNUAL PHYSICAL EXAM: Primary | ICD-10-CM

## 2025-03-18 DIAGNOSIS — M85.80 OSTEOPENIA, UNSPECIFIED LOCATION: ICD-10-CM

## 2025-03-18 DIAGNOSIS — E80.6 HYPERBILIRUBINEMIA: ICD-10-CM

## 2025-03-18 DIAGNOSIS — Z00.00 ROUTINE HEALTH MAINTENANCE: ICD-10-CM

## 2025-03-18 DIAGNOSIS — F51.01 PRIMARY INSOMNIA: ICD-10-CM

## 2025-03-18 PROCEDURE — 99396 PREV VISIT EST AGE 40-64: CPT | Performed by: STUDENT IN AN ORGANIZED HEALTH CARE EDUCATION/TRAINING PROGRAM

## 2025-03-18 RX ORDER — ALENDRONATE SODIUM 70 MG/1
70 TABLET ORAL
Qty: 12 TABLET | Refills: 3 | Status: SHIPPED | OUTPATIENT
Start: 2025-03-18 | End: 2026-02-17

## 2025-03-18 RX ORDER — TRAZODONE HYDROCHLORIDE 50 MG/1
50 TABLET ORAL NIGHTLY
Qty: 30 TABLET | Refills: 0 | Status: SHIPPED | OUTPATIENT
Start: 2025-03-18 | End: 2025-04-17

## 2025-03-18 NOTE — PROGRESS NOTES
"Chief Complaint  Annual Exam    Subjective        Sofia Mendez presents to [facility]  History of Present Illness  /78 (BP Location: Left arm, Patient Position: Sitting, Cuff Size: Small Adult)   Pulse 70   Resp 20   Ht 177.8 cm (70\")   Wt 67.9 kg (149 lb 9.6 oz)   SpO2 97%   BMI 21.47 kg/m²   Patient is here for yearly CPE. Last CPE was  1 year ago.  PMH, SH and FH reviewed. Changes in the FH: none .  Patient rates her own health as: good.  Tobacco use: in remote past, as per SH.  Alcohol use:1 drink daily.  Recreational drugs use: occ lina  Medication list reviewed.  Diet: well balanced.  Exercise:  exercises on a regular basis including cycling .  Marital status: .   Employment: full time.  Patient rates her stress level as: moderate.  Dental health: good. Brushes teeth 2 times a day, flosses 1 time a day . Dental visits: 2 times a year .  Vision correction:  Has annual eye exams wears glasses at night  Hearing: normal.    Recent vaccinations:   Flu  is up to date and recommended yearly  Tdap  is up to date  Shingles prevention completed  PCV 21 could be considered not done, pt unsure and will check vaccine records  Patient is postmenopausal.    Current Outpatient Medications:     alendronate (FOSAMAX) 70 MG tablet, Take 1 tablet by mouth Every 7 (Seven) Days for 336 days. FRIDAYS, Disp: 12 tablet, Rfl: 3    apixaban (Eliquis) 5 MG tablet tablet, Take 1 tablet by mouth 2 (Two) Times a Day., Disp: 60 tablet, Rfl: 2    cetirizine (zyrTEC) 5 MG tablet, Take 1 tablet by mouth Daily., Disp: , Rfl:     Doxylamine Succinate, Sleep, (SLEEP AID PO), Take 1 tablet by mouth At Night As Needed., Disp: , Rfl:     fexofenadine (ALLEGRA) 60 MG tablet, Take 1 tablet by mouth As Needed., Disp: , Rfl:     fluorouracil (EFUDEX) 5 % cream, Apply 1 Application topically to the appropriate area as directed As Needed., Disp: , Rfl:     HYDROcodone-acetaminophen (NORCO) 5-325 MG per " "tablet, Take 1 tablet by mouth Every 6 (Six) Hours As Needed (Pain)., Disp: 20 tablet, Rfl: 0    melatonin 1 MG tablet, Take 1 tablet by mouth., Disp: , Rfl:     tretinoin (RETIN-A) 0.05 % cream, Apply  topically Every Night., Disp: 45 g, Rfl: 3    Vitamin D-Vitamin K (VITAMIN D2 + K1 PO), Take 1 tablet by mouth Daily. PT HOLDING FOR SURGERY, Disp: , Rfl:     traZODone (DESYREL) 50 MG tablet, Take 1 tablet by mouth Every Night for 30 days., Disp: 30 tablet, Rfl: 0      Risk evaluation:  1. Cardiovascular risk factors: age   2. Diabetes risk factors: none.  3. Cancer risk factors: h/o tocacco smoking.    Prevention:  Cholesterol test  up to date. Cholesterol is Your next 10 years cardiovascular disease risk calculated according to American College of Cardiology Guidelines is still low at 4.8 % and no medical treatment is indicated at that time.  The 10-year ASCVD risk score (Yandy DORAN, et al., 2019) is: 4.8%    Values used to calculate the score:      Age: 64 years      Sex: Female      Is Non- : No      Diabetic: No      Tobacco smoker: No      Systolic Blood Pressure: 136 mmHg      Is BP treated: No      HDL Cholesterol: 88 mg/dL      Total Cholesterol: 222 mg/dL     Blood sugar test up to date. Fasting blood sugar  is normal..  Hep C testing:  Mammogram up to date. Recommended every year.. Breast self exams recommended once a month.  Colonoscopy: up to date. Recommended every 10 years. Next screening is due in 2035..  PAP smear : up to date. Recommendations per GYN..  DEXA : up to date. Recommendations per GYN..                         Objective   Vital Signs:  /78 (BP Location: Left arm, Patient Position: Sitting, Cuff Size: Small Adult)   Pulse 70   Resp 20   Ht 177.8 cm (70\")   Wt 67.9 kg (149 lb 9.6 oz)   SpO2 97%   BMI 21.47 kg/m²   Estimated body mass index is 21.47 kg/m² as calculated from the following:    Height as of this encounter: 177.8 cm (70\").    Weight as of this " encounter: 67.9 kg (149 lb 9.6 oz).    BMI is within normal parameters. No other follow-up for BMI required.      Physical Exam  Vitals reviewed.   Constitutional:       General: She is not in acute distress.     Appearance: Normal appearance. She is not toxic-appearing.   HENT:      Right Ear: Tympanic membrane normal. There is no impacted cerumen.      Left Ear: Tympanic membrane normal. There is no impacted cerumen.   Eyes:      General:         Right eye: No discharge.         Left eye: No discharge.   Neck:      Thyroid: No thyromegaly.   Cardiovascular:      Rate and Rhythm: Normal rate and regular rhythm.      Pulses: Normal pulses.      Heart sounds: No murmur heard.  Pulmonary:      Effort: Pulmonary effort is normal. No respiratory distress.      Breath sounds: Normal breath sounds. No wheezing.   Abdominal:      General: Abdomen is flat. Bowel sounds are normal. There is no distension.      Palpations: Abdomen is soft. There is no mass.      Tenderness: There is no abdominal tenderness.   Musculoskeletal:      Right lower leg: No edema.      Left lower leg: No edema.      Comments: Compression hose in place on left leg   Skin:     General: Skin is warm and dry.      Capillary Refill: Capillary refill takes less than 2 seconds.      Comments: No rashes noted   Neurological:      Mental Status: She is alert and oriented to person, place, and time.      Motor: No weakness.   Psychiatric:         Mood and Affect: Mood normal.         Thought Content: Thought content normal.         Judgment: Judgment normal.        Result Review :  The following data was reviewed by: Nayely Little MD on 03/18/2025:  Common labs          11/5/2024    15:57 1/27/2025    16:13 2/24/2025    13:30   Common Labs   Glucose 94  87  87    BUN 17  17  18    Creatinine 0.99  1.12  1.01    Sodium 141  139  141    Potassium 3.5  3.9  3.9    Chloride 104  104  104    Calcium 8.3  8.7  9.2    Albumin 4.2   4.0    Total Bilirubin 1.4    2.3    Alkaline Phosphatase 56   52    AST (SGOT) 22   19    ALT (SGPT) 8   10    WBC 9.26  7.24  6.14    Hemoglobin 14.2  13.9  13.6    Hematocrit 44.9  41.1  40.9    Platelets 236  239  238    Total Cholesterol   222    Triglycerides   113    HDL Cholesterol   88    LDL Cholesterol    115                Assessment and Plan   Diagnoses and all orders for this visit:    1. Annual physical exam (Primary)    2. Routine health maintenance    3. Hyperbilirubinemia    4. Primary insomnia  -     traZODone (DESYREL) 50 MG tablet; Take 1 tablet by mouth Every Night for 30 days.  Dispense: 30 tablet; Refill: 0    5. Osteopenia, unspecified location  -     alendronate (FOSAMAX) 70 MG tablet; Take 1 tablet by mouth Every 7 (Seven) Days for 336 days. FRIDAYS  Dispense: 12 tablet; Refill: 3             Follow Up   Return in about 1 year (around 3/18/2026) for Medicare Wellness.  Patient was given instructions and counseling regarding her condition or for health maintenance advice. Please see specific information pulled into the AVS if appropriate.

## 2025-03-24 RX ORDER — APIXABAN 5 MG/1
5 TABLET, FILM COATED ORAL 2 TIMES DAILY
Qty: 60 TABLET | Refills: 2 | Status: SHIPPED | OUTPATIENT
Start: 2025-03-24

## 2025-03-25 ENCOUNTER — TELEPHONE (OUTPATIENT)
Age: 65
End: 2025-03-25
Payer: COMMERCIAL

## 2025-03-25 NOTE — TELEPHONE ENCOUNTER
Spoke with the patient about sizing and she was unsure of her size. She called [facility] and got that information for me. I called them myself for their fax number. I handed the rx and the fax number over to Fransisca. He will be faxing it as soon as he can.

## 2025-03-27 ENCOUNTER — HOSPITAL ENCOUNTER (OUTPATIENT)
Dept: ULTRASOUND IMAGING | Facility: HOSPITAL | Age: 65
Discharge: HOME | End: 2025-03-27
Payer: COMMERCIAL

## 2025-04-14 ENCOUNTER — HOSPITAL ENCOUNTER (OUTPATIENT)
Facility: HOSPITAL | Age: 65
Discharge: HOME | End: 2025-04-14
Payer: COMMERCIAL

## 2025-04-14 DIAGNOSIS — I82.91 CHRONIC DEEP VEIN THROMBOSIS (DVT) OF NON-EXTREMITY VEIN: ICD-10-CM

## 2025-04-14 DIAGNOSIS — F51.01 PRIMARY INSOMNIA: ICD-10-CM

## 2025-04-15 RX ORDER — TRAZODONE HYDROCHLORIDE 50 MG/1
50 TABLET ORAL NIGHTLY
Qty: 30 TABLET | Refills: 0 | Status: SHIPPED | OUTPATIENT
Start: 2025-04-15

## 2025-04-16 ENCOUNTER — HOSPITAL ENCOUNTER (OUTPATIENT)
Dept: MAMMOGRAPHY | Facility: HOSPITAL | Age: 65
Discharge: HOME | End: 2025-04-16
Payer: COMMERCIAL

## 2025-04-16 ENCOUNTER — HOSPITAL ENCOUNTER (OUTPATIENT)
Dept: BONE DENSITY | Facility: HOSPITAL | Age: 65
Discharge: HOME | End: 2025-04-16
Payer: COMMERCIAL

## 2025-04-16 DIAGNOSIS — M85.80 OSTEOPENIA, UNSPECIFIED LOCATION: ICD-10-CM

## 2025-04-16 DIAGNOSIS — Z12.31 ENCOUNTER FOR SCREENING MAMMOGRAM FOR MALIGNANT NEOPLASM OF BREAST: ICD-10-CM

## 2025-04-16 PROCEDURE — 77067 SCR MAMMO BI INCL CAD: CPT

## 2025-04-16 PROCEDURE — 77080 DXA BONE DENSITY AXIAL: CPT

## 2025-04-16 PROCEDURE — 77063 BREAST TOMOSYNTHESIS BI: CPT

## 2025-05-01 ENCOUNTER — HOSPITAL ENCOUNTER (OUTPATIENT)
Facility: HOSPITAL | Age: 65
Discharge: HOME | End: 2025-05-01
Admitting: SURGERY
Payer: COMMERCIAL

## 2025-05-01 PROCEDURE — 93979 VASCULAR STUDY: CPT

## 2025-05-02 LAB
BH CV VAS ABD AO IVC SPONTANEOUS SCRIPTING: NORMAL
BH CV VAS ABD AO LT COMMON ILIAC PHASIC SCRIPTING: NORMAL
BH CV VAS ABD AO LT COMMON ILIAC SPONTANEOUS SCRIPTING: NORMAL
BH CV VAS ABD AO LT EXTERNAL ILIAC PHASIC SCRIPTING: NORMAL
BH CV VAS ABD AO LT EXTERNAL ILIAC SPONT SCRIPTING: NORMAL
BH CV VAS ABD AO RT COM ILIAC PHASIC SCRIPTING: NORMAL
BH CV VAS ABD AO RT COM ILIAC SPONTANEOUS SCRIPTING: NORMAL
BH CV VAS ABD AO RT EXT ILIAC PHASIC SCRIPTING: NORMAL
BH CV VAS ABD AO RT EXT ILIAC SPONTANEOUS SCRIPTING: NORMAL

## 2025-05-14 DIAGNOSIS — F51.01 PRIMARY INSOMNIA: ICD-10-CM

## 2025-05-14 RX ORDER — TRAZODONE HYDROCHLORIDE 50 MG/1
50 TABLET ORAL NIGHTLY
Qty: 30 TABLET | Refills: 0 | Status: SHIPPED | OUTPATIENT
Start: 2025-05-14

## 2025-05-15 ENCOUNTER — OFFICE VISIT (OUTPATIENT)
Age: 65
End: 2025-05-15
Payer: COMMERCIAL

## 2025-05-15 VITALS
WEIGHT: 148 LBS | HEART RATE: 74 BPM | RESPIRATION RATE: 16 BRPM | HEIGHT: 70 IN | DIASTOLIC BLOOD PRESSURE: 76 MMHG | SYSTOLIC BLOOD PRESSURE: 120 MMHG | BODY MASS INDEX: 21.19 KG/M2

## 2025-05-15 DIAGNOSIS — D69.6 THROMBOCYTOPENIA: ICD-10-CM

## 2025-05-15 DIAGNOSIS — I82.91 CHRONIC DEEP VEIN THROMBOSIS (DVT) OF NON-EXTREMITY VEIN: Primary | ICD-10-CM

## 2025-05-15 NOTE — PROGRESS NOTES
"Patient Name: Sofia Mendez    MRN: [id number] Encounter Date: 05/15/2025      Consulting Service: Vascular Surgery    Referring Provider: No ref. provider found       CHIEF COMPLAINT:  Chief Complaint   Patient presents with    Deep Vein Thrombosis       Subjective    HPI: Sofia Mendez is a 64 y.o. female being seen for evaluation/management of vein intervention.  Patient presents today for follow-up of DVT and filter removal with suction thrombectomy of retained thrombus in the vena cava.  Symptomatically she appears to be well without any real leg swelling or problems.  We discussed in detail the possibility of taking low-dose Eliquis for travel as we have some questions as to whether this was a provoked travel DVT or a unprovoked DVT.  This is a first event DVT and I believe that stopping the Eliquis overall is a reasonable choice and plan.      PAST MEDICAL HISTORY:   Past Medical History:   Diagnosis Date    Acute kidney injury 12/2023    Allergic     Anxiety     Arthritis     COVID     Depression     History of COVID-19     History of DVT (deep vein thrombosis) 2023    lower extremity    History of melanoma     Osteopenia     Presence of IVC filter     THROMBUS/SCHED TO HAVE REMOVED 2/4/25    Seasonal allergies       PAST SURGICAL HISTORY:   Past Surgical History:   Procedure Laterality Date    APPENDECTOMY      COLONOSCOPY  2014    Normal    COLONOSCOPY      COLONOSCOPY N/A 01/29/2025    MULTIPLE DIVERTICULA IN SIGMOID, RESCOPE IN 10 YRS, DR. LAI HICKS AT Located within [facility]    FOOT SURGERY Right     HIP SURGERY Right     \"REATTACHED GLUTEUS MEDIUS\"    OTHER SURGICAL HISTORY      Eyebrow lift    SKIN CANCER EXCISION      VENA CAVA FILTER INSERTION  12/2023    VENA CAVA FILTER REMOVAL N/A 2/4/2025    Procedure: VENA CAVA FILTER REMOVAL with inferior vena cava thrombectomy;  Surgeon: Eligio Perdue MD;  Location: [facility];  Service: Vascular;  Laterality: N/A;    WISDOM TOOTH EXTRACTION        FAMILY HISTORY: "   Family History   Problem Relation Age of Onset    No Known Problems Mother     Heart disease Father     Heart attack Father     No Known Problems Brother     Pedro Hyperthermia Neg Hx       SOCIAL HISTORY:   Social History     Tobacco Use    Smoking status: Former     Current packs/day: 0.00     Types: Cigarettes     Quit date: 2023     Years since quittin.7     Passive exposure: Past    Smokeless tobacco: Never   Vaping Use    Vaping status: Never Used   Substance Use Topics    Alcohol use: Yes     Comment: 2x/day    Drug use: Yes     Frequency: 2.0 times per week     Types: Marijuana     Comment: ADVISED TO STOP PRIOR TO SURGERY      MEDICATIONS:   Current Outpatient Medications on File Prior to Visit   Medication Sig Dispense Refill    alendronate (FOSAMAX) 70 MG tablet Take 1 tablet by mouth Every 7 (Seven) Days for 336 days.  12 tablet 3    cetirizine (zyrTEC) 5 MG tablet Take 1 tablet by mouth Daily.      Eliquis 5 MG tablet tablet TAKE 1 TABLET BY MOUTH 2 TIMES A DAY 60 tablet 2    fexofenadine (ALLEGRA) 60 MG tablet Take 1 tablet by mouth As Needed.      melatonin 1 MG tablet Take 1 tablet by mouth.      traZODone (DESYREL) 50 MG tablet TAKE 1 TABLET BY MOUTH ONCE NIGHTLY 30 tablet 0    tretinoin (RETIN-A) 0.05 % cream Apply  topically Every Night. 45 g 3    Vitamin D-Vitamin K (VITAMIN D2 + K1 PO) Take 1 tablet by mouth Daily. PT HOLDING FOR SURGERY      Doxylamine Succinate, Sleep, (SLEEP AID PO) Take 1 tablet by mouth At Night As Needed. (Patient not taking: Reported on 5/15/2025)      fluorouracil (EFUDEX) 5 % cream Apply 1 Application topically to the appropriate area as directed As Needed. (Patient not taking: Reported on 5/15/2025)      HYDROcodone-acetaminophen (NORCO) 5-325 MG per tablet Take 1 tablet by mouth Every 6 (Six) Hours As Needed (Pain). (Patient not taking: Reported on 5/15/2025) 20 tablet 0     No current facility-administered medications on file prior to visit.  "      ALLERGIES: Iodinated contrast media       Objective   Vitals:    05/15/25 0818   BP: 120/76   BP Location: Left arm   Patient Position: Sitting   Cuff Size: Adult   Pulse: 74   Resp: 16   Weight: 67.1 kg (148 lb)   Height: 177 cm (69.69\")     Body mass index is 21.43 kg/m².  BMI is within normal parameters. No other follow-up for BMI required.      PHYSICAL EXAM:   Physical Exam  Constitutional:       Appearance: Normal appearance.   HENT:      Head: Normocephalic and atraumatic.      Nose: Nose normal.   Eyes:      Extraocular Movements: Extraocular movements intact.      Pupils: Pupils are equal, round, and reactive to light.   Cardiovascular:      Rate and Rhythm: Normal rate.      Pulses: Normal pulses.      Heart sounds: Normal heart sounds.   Pulmonary:      Effort: Pulmonary effort is normal.      Breath sounds: Normal breath sounds.   Abdominal:      General: Abdomen is flat. Bowel sounds are normal.      Palpations: Abdomen is soft.   Musculoskeletal:         General: Normal range of motion.      Cervical back: Normal range of motion and neck supple.      Right lower leg: Edema present.      Left lower leg: Edema present.   Skin:     General: Skin is warm and dry.   Neurological:      General: No focal deficit present.      Mental Status: She is alert and oriented to person, place, and time. Mental status is at baseline.   Psychiatric:         Mood and Affect: Mood normal.         Thought Content: Thought content normal.          Result Review   LABS:    CBC          11/5/2024    15:57 1/27/2025    16:13 2/24/2025    13:30   CBC   WBC 9.26  7.24  6.14    RBC 4.67  4.45  4.32    Hemoglobin 14.2  13.9  13.6    Hematocrit 44.9  41.1  40.9    MCV 96.1  92.4  94.7    MCH 30.4  31.2  31.5    MCHC 31.6  33.8  33.3    RDW 13.6  12.9  12.0    Platelets 236  239  238      BMP          11/5/2024    15:57 1/27/2025    16:13 2/24/2025    13:30   BMP   BUN 17  17  18    Creatinine 0.99  1.12  1.01    Sodium 141  " 139  141    Potassium 3.5  3.9  3.9    Chloride 104  104  104    CO2 23.8  27.0  27.3    Calcium 8.3  8.7  9.2      Lipid Panel          2/24/2025    13:30   Lipid Panel   Total Cholesterol 222    Triglycerides 113    HDL Cholesterol 88    VLDL Cholesterol 19    LDL Cholesterol  115       INR          6/18/2024    10:04   Common Labs   INR 0.9          Details          This result is from an external source.                   Results Review:       I reviewed the patient's new clinical results.    The following radiologic or non-invasive studies have been reviewed by me: Duplex scan reviewed and actual no evidence of free residual clot in the vena cava and the filter removal site looks great.  Duplex Aorta IVC Iliac Graft Limited CAR 05/01/2025    Interpretation Summary    Normal inferior vena cava and bilateral iliac venous systems with no evidence of filling defect, thrombosis or stenosis or compression.     DEXA Bone Density Axial  Result Date: 4/17/2025  1. Osteopenia. 2. FRAX score not reported because the patient is undergoing treatment for osteoporosis.   Current recommendations are that a follow-up bone density be obtained at an interval of not less than 2 years except in specific circumstances, such as after initiation or change of therapy.  The Bone Health and Osteoporosis Foundation (BHOF) recommends consideration of FDA-approved pharmacologic therapy in postmenopausal women and men > 50 years old with: * Fracture of vertebrae (clinical or subclinical), hip, wrist, pelvis, or humerus. * DXA T-score -2.5 or lower in the lumbar spine, femoral neck, or total hip. Predictive value of isolated measurement of 1/3 radius is currently being investigated (use clinical judgment). * Low bone mass (osteopenia) and a US-adapted WHO 10-year probability of a hip fracture > 3% or 10-year probability of any major osteoporosis-related fracture > 20%. * Patient preferences may indicate treatment for people with 10-year  fracture probabilities above or below these levels. *Danie MS, Radha SL, Heather KL, et al. The clinician`s guide to prevention and treatment of osteoporosis [published correction appears in Osteoporos Int. 2022 Jul 28]. Osteoporos Int. 2022;33(10):9027-9645.  This report was finalized on 4/17/2025 5:23 PM by Nikolas Turner MD on Workstation: [id number]                    ASSESSMENT/PLAN:   Diagnoses and all orders for this visit:    1. Chronic deep vein thrombosis (DVT) of non-extremity vein (Primary)    2. Thrombocytopenia       64 y.o. female with successful removal of filter and removal of thrombus associated with it.  Her initial DVT was possibly unprovoked but I believe is more likely travelers DVT and going forward she will use 2.5 mg dosing of the Eliquis on days of flights.  She should still be on take Motrin with that.  At this point in time I do not think we need repeat scans as her last study in October was negative for lower extremity DVT and this study looked great here.  We will stop her anticoagulants and she will call if there is any issues going forward.  I told her to call earlier if she has concerns and if she does have another event that would obviously require lifelong anticoagulation.    I discussed the plan with the patient who is agreeable to the plan of care at this point. Thank you for this consult.   Follow Up  Return if symptoms worsen or fail to improve.    Eligio Perdue MD   05/15/25

## 2025-06-10 DIAGNOSIS — F51.01 PRIMARY INSOMNIA: ICD-10-CM

## 2025-06-10 RX ORDER — TRAZODONE HYDROCHLORIDE 50 MG/1
50 TABLET ORAL NIGHTLY
Qty: 30 TABLET | Refills: 0 | Status: SHIPPED | OUTPATIENT
Start: 2025-06-10

## 2025-06-19 RX ORDER — FEXOFENADINE HCL 60 MG/1
60 TABLET, FILM COATED ORAL DAILY
COMMUNITY

## 2025-06-19 RX ORDER — APIXABAN 5 MG/1
5 TABLET, FILM COATED ORAL 2 TIMES DAILY
Qty: 60 TABLET | Refills: 2 | OUTPATIENT
Start: 2025-06-19

## 2025-06-19 NOTE — TELEPHONE ENCOUNTER
Left message requesting call back to RN direct number, [phone].  Let her know we had received a refill request for the Eliquis, but she needs an appointment scheduled with Dr. Davalos.    Call back to MsLiam Vanessa and she states she had called and let someone in this office know that she had seen Dr. Perdue and had IVC filter removed and he discontinued the Eliquis and told her she would only need to take if she was traveling.  Patient does not think she needs to be followed in this office.  Let her know DR. Davalos will be updated, and refills will not be authorized. Understanding verbalized.

## 2025-06-19 NOTE — TELEPHONE ENCOUNTER
Accidentally removed the Allegra, meaning to remove Eliquis, therefore the Allegra was added back as patient reported med.

## 2025-07-08 DIAGNOSIS — F51.01 PRIMARY INSOMNIA: ICD-10-CM

## 2025-07-08 RX ORDER — TRAZODONE HYDROCHLORIDE 50 MG/1
50 TABLET ORAL NIGHTLY
Qty: 30 TABLET | Refills: 0 | Status: SHIPPED | OUTPATIENT
Start: 2025-07-08

## 2025-08-06 DIAGNOSIS — F51.01 PRIMARY INSOMNIA: ICD-10-CM

## 2025-08-07 RX ORDER — TRAZODONE HYDROCHLORIDE 50 MG/1
50 TABLET ORAL NIGHTLY
Qty: 30 TABLET | Refills: 0 | OUTPATIENT
Start: 2025-08-07

## 2025-08-08 ENCOUNTER — DOCUMENTATION (OUTPATIENT)
Dept: INTERNAL MEDICINE | Facility: CLINIC | Age: 65
End: 2025-08-08
Payer: COMMERCIAL

## 2025-08-17 DIAGNOSIS — F51.01 PRIMARY INSOMNIA: ICD-10-CM

## 2025-08-18 RX ORDER — TRAZODONE HYDROCHLORIDE 50 MG/1
50 TABLET ORAL NIGHTLY
Qty: 30 TABLET | Refills: 0 | Status: SHIPPED | OUTPATIENT
Start: 2025-08-18

## 2025-08-25 ENCOUNTER — PATIENT MESSAGE (OUTPATIENT)
Dept: INTERNAL MEDICINE | Facility: CLINIC | Age: 65
End: 2025-08-25
Payer: COMMERCIAL

## (undated) DEVICE — PK ANGIO 40

## (undated) DEVICE — BG TRANSF W/COUPLER SPK 600ML

## (undated) DEVICE — LN SMPL CO2 SHTRM SD STREAM W/M LUER

## (undated) DEVICE — SYR LUERLOK 5CC

## (undated) DEVICE — TUBING, SUCTION, 1/4" X 10', STRAIGHT: Brand: MEDLINE

## (undated) DEVICE — CATH ANGIO TRCN NB BCN .038 5F 100CM DAV

## (undated) DEVICE — SOL NS 500ML

## (undated) DEVICE — Device

## (undated) DEVICE — CP INARI VTE

## (undated) DEVICE — SYR LUERLOK 20CC BX/50

## (undated) DEVICE — ADAPT CLN BIOGUARD AIR/H2O DISP

## (undated) DEVICE — SHEET, T, LAPAROTOMY, STERILE: Brand: MEDLINE

## (undated) DEVICE — CATH GUIDE SOFTVU FLUSH HT STR .035 5F 90CM

## (undated) DEVICE — SYRINGE KIT,PACKAGED,,150FT,MK 7(ANGIO-ARTERION, 150ML SYR KIT W/QFT,MC)(60729385): Brand: MEDRAD® MARK 7 ARTERION DISPOSABLE SYRINGE 150 ML WITH QUICK FILL TUBE

## (undated) DEVICE — SYR LUERLOK 30CC

## (undated) DEVICE — Device: Brand: D-STAT® DRY SILVER CLEAR TOPICAL HEMOSTAT

## (undated) DEVICE — PK ATS CUST W CARDIOTOMY RESEVOIR

## (undated) DEVICE — CVR PROB 96IN LF STRL

## (undated) DEVICE — RADIFOCUS GLIDEWIRE: Brand: GLIDEWIRE

## (undated) DEVICE — LARGE BORE 60 CC SYRINGE: Brand: LARGE BORE 60 CC SYRINGE

## (undated) DEVICE — RADIFOCUS GLIDEWIRE ADVANTAGE GUIDEWIRE: Brand: GLIDEWIRE ADVANTAGE

## (undated) DEVICE — TBG SXN PERFUS W TP

## (undated) DEVICE — GLV SURG BIOGEL LTX PF 7 1/2

## (undated) DEVICE — SENSR O2 OXIMAX FNGR A/ 18IN NONSTR

## (undated) DEVICE — KT ORCA ORCAPOD DISP STRL

## (undated) DEVICE — PINNACLE R/O II INTRODUCER SHEATH WITH RADIOPAQUE MARKER: Brand: PINNACLE

## (undated) DEVICE — ST ACC MICROPUNCTURE STFF .018 ECHO/PLAT/TP 4F/10CM 21G/7CM

## (undated) DEVICE — CATH GUIDE SOFTVU FLUSH HT PIG .035 5F 65CM

## (undated) DEVICE — CANN O2 ETCO2 FITS ALL CONN CO2 SMPL A/ 7IN DISP LF